# Patient Record
Sex: FEMALE | Race: WHITE | NOT HISPANIC OR LATINO | Employment: PART TIME | URBAN - METROPOLITAN AREA
[De-identification: names, ages, dates, MRNs, and addresses within clinical notes are randomized per-mention and may not be internally consistent; named-entity substitution may affect disease eponyms.]

---

## 2017-04-25 ENCOUNTER — OFFICE VISIT (OUTPATIENT)
Dept: RETAIL CLINIC | Facility: CLINIC | Age: 27
End: 2017-04-25

## 2017-04-25 VITALS — TEMPERATURE: 97.9 F | OXYGEN SATURATION: 99 % | HEART RATE: 90 BPM | RESPIRATION RATE: 18 BRPM

## 2017-04-25 DIAGNOSIS — J45.20 BRONCHITIS, ALLERGIC, MILD INTERMITTENT, UNCOMPLICATED: Primary | ICD-10-CM

## 2017-04-25 PROCEDURE — 99213 OFFICE O/P EST LOW 20 MIN: CPT | Performed by: NURSE PRACTITIONER

## 2017-04-25 RX ORDER — PREDNISONE 20 MG/1
20 TABLET ORAL 2 TIMES DAILY
Qty: 8 TABLET | Refills: 0 | Status: SHIPPED | OUTPATIENT
Start: 2017-04-25 | End: 2017-04-29

## 2017-04-25 RX ORDER — BROMPHENIRAMINE MALEATE, PSEUDOEPHEDRINE HYDROCHLORIDE, AND DEXTROMETHORPHAN HYDROBROMIDE 2; 30; 10 MG/5ML; MG/5ML; MG/5ML
5 SYRUP ORAL 4 TIMES DAILY PRN
Qty: 118 ML | Refills: 0 | Status: SHIPPED | OUTPATIENT
Start: 2017-04-25 | End: 2017-05-02

## 2017-04-25 RX ORDER — ALBUTEROL SULFATE 90 UG/1
2 AEROSOL, METERED RESPIRATORY (INHALATION) EVERY 4 HOURS PRN
Qty: 1 INHALER | Refills: 0 | Status: SHIPPED | OUTPATIENT
Start: 2017-04-25

## 2017-04-25 RX ORDER — GUAIFENESIN 600 MG/1
1200 TABLET, EXTENDED RELEASE ORAL 2 TIMES DAILY
Start: 2017-04-25 | End: 2023-01-18

## 2017-04-25 RX ORDER — FLUTICASONE PROPIONATE 50 MCG
2 SPRAY, SUSPENSION (ML) NASAL DAILY
Qty: 1 EACH | Refills: 0 | Status: SHIPPED | OUTPATIENT
Start: 2017-04-25 | End: 2017-05-25

## 2017-04-25 NOTE — PROGRESS NOTES
Subjective   Marisol Black is a 26 y.o. female presents to the clinic with reports of cough, chest congestion, and burning in the chest when breathing.     Cough   This is a new problem. The current episode started in the past 7 days. The problem has been gradually worsening. The problem occurs every few minutes. The cough is non-productive. Associated symptoms include ear congestion, headaches, postnasal drip and shortness of breath (occasional). Pertinent negatives include no fever or wheezing. Ear pain: fullness. She has tried OTC cough suppressant for the symptoms. The treatment provided no relief. Her past medical history is significant for asthma and environmental allergies.       The following portions of the patient's history were reviewed and updated as appropriate: allergies, current medications, past family history, past medical history, past social history, past surgical history and problem list.    Review of Systems   Constitutional: Negative for fever.   HENT: Positive for postnasal drip and voice change. Negative for ear discharge, sinus pressure, sneezing and trouble swallowing. Ear pain: fullness.    Eyes: Negative.    Respiratory: Positive for cough and shortness of breath (occasional). Negative for wheezing.    Cardiovascular: Negative.    Gastrointestinal: Negative.    Musculoskeletal: Negative.    Allergic/Immunologic: Positive for environmental allergies.   Neurological: Positive for headaches.   Psychiatric/Behavioral: Negative.        Objective   Physical Exam   Constitutional: She appears well-developed and well-nourished.   HENT:   Head: Normocephalic and atraumatic.   Right Ear: Hearing, external ear and ear canal normal. A middle ear effusion is present.   Left Ear: Hearing, external ear and ear canal normal. A middle ear effusion is present.   Nose: Nose normal.   Mouth/Throat: Uvula is midline and mucous membranes are normal. Posterior oropharyngeal erythema present.   Eyes: Pupils  are equal, round, and reactive to light.   Neck: Normal range of motion.   Cardiovascular: Normal rate, regular rhythm, S1 normal, S2 normal and normal heart sounds.    No murmur heard.  Pulmonary/Chest: Effort normal. She has decreased breath sounds in the right middle field, the right lower field, the left middle field and the left lower field. She has wheezes in the right upper field.   Vitals reviewed.    Vitals:    04/25/17 0901   Pulse: 90   Resp: 18   Temp: 97.9 °F (36.6 °C)   SpO2: 99%     Assessment/Plan   Marisol was seen today for cough.    Diagnoses and all orders for this visit:    Bronchitis, allergic, mild intermittent, uncomplicated  -     brompheniramine-pseudoephedrine-DM 30-2-10 MG/5ML syrup; Take 5 mL by mouth 4 (Four) Times a Day As Needed for Allergies for up to 7 days.  -     predniSONE (DELTASONE) 20 MG tablet; Take 1 tablet by mouth 2 (Two) Times a Day for 4 days.  -     albuterol (PROVENTIL HFA;VENTOLIN HFA) 108 (90 BASE) MCG/ACT inhaler; Inhale 2 puffs Every 4 (Four) Hours As Needed for Wheezing or Shortness of Air.  -     guaiFENesin (MUCINEX) 600 MG 12 hr tablet; Take 2 tablets by mouth 2 (Two) Times a Day.  -     fluticasone (FLONASE) 50 MCG/ACT nasal spray; 2 sprays into each nostril Daily for 30 days.     Plan of care reviewed with patient &/or caregiver during today's visit. Education was provided in regards to diagnosis, symptom management and any prescribed or recommended OTC medications.  Advisedt to follow up with PCP, Urgent Treatment Center, or Emergency Room if symptoms worsen. Patient and/or caregiver verbalized understanding.    SIRISHA Borrero

## 2017-04-25 NOTE — PATIENT INSTRUCTIONS
"Upper Respiratory Infection, Adult  Most upper respiratory infections (URIs) are a viral infection of the air passages leading to the lungs. A URI affects the nose, throat, and upper air passages. The most common type of URI is nasopharyngitis and is typically referred to as \"the common cold.\"  URIs run their course and usually go away on their own. Most of the time, a URI does not require medical attention, but sometimes a bacterial infection in the upper airways can follow a viral infection. This is called a secondary infection. Sinus and middle ear infections are common types of secondary upper respiratory infections.  Bacterial pneumonia can also complicate a URI. A URI can worsen asthma and chronic obstructive pulmonary disease (COPD). Sometimes, these complications can require emergency medical care and may be life threatening.   CAUSES  Almost all URIs are caused by viruses. A virus is a type of germ and can spread from one person to another.   RISKS FACTORS  You may be at risk for a URI if:   · You smoke.    · You have chronic heart or lung disease.  · You have a weakened defense (immune) system.    · You are very young or very old.    · You have nasal allergies or asthma.  · You work in crowded or poorly ventilated areas.  · You work in health care facilities or schools.  SIGNS AND SYMPTOMS   Symptoms typically develop 2-3 days after you come in contact with a cold virus. Most viral URIs last 7-10 days. However, viral URIs from the influenza virus (flu virus) can last 14-18 days and are typically more severe. Symptoms may include:   · Runny or stuffy (congested) nose.    · Sneezing.    · Cough.    · Sore throat.    · Headache.    · Fatigue.    · Fever.    · Loss of appetite.    · Pain in your forehead, behind your eyes, and over your cheekbones (sinus pain).  · Muscle aches.    DIAGNOSIS   Your health care provider may diagnose a URI by:  · Physical exam.  · Tests to check that your symptoms are not due to " another condition such as:  ¨ Strep throat.  ¨ Sinusitis.  ¨ Pneumonia.  ¨ Asthma.  TREATMENT   A URI goes away on its own with time. It cannot be cured with medicines, but medicines may be prescribed or recommended to relieve symptoms. Medicines may help:  · Reduce your fever.  · Reduce your cough.  · Relieve nasal congestion.  HOME CARE INSTRUCTIONS   · Take medicines only as directed by your health care provider.    · Gargle warm saltwater or take cough drops to comfort your throat as directed by your health care provider.  · Use a warm mist humidifier or inhale steam from a shower to increase air moisture. This may make it easier to breathe.  · Drink enough fluid to keep your urine clear or pale yellow.    · Eat soups and other clear broths and maintain good nutrition.    · Rest as needed.    · Return to work when your temperature has returned to normal or as your health care provider advises. You may need to stay home longer to avoid infecting others. You can also use a face mask and careful hand washing to prevent spread of the virus.  · Increase the usage of your inhaler if you have asthma.    · Do not use any tobacco products, including cigarettes, chewing tobacco, or electronic cigarettes. If you need help quitting, ask your health care provider.  PREVENTION   The best way to protect yourself from getting a cold is to practice good hygiene.   · Avoid oral or hand contact with people with cold symptoms.    · Wash your hands often if contact occurs.    There is no clear evidence that vitamin C, vitamin E, echinacea, or exercise reduces the chance of developing a cold. However, it is always recommended to get plenty of rest, exercise, and practice good nutrition.   SEEK MEDICAL CARE IF:   · You are getting worse rather than better.    · Your symptoms are not controlled by medicine.    · You have chills.  · You have worsening shortness of breath.  · You have brown or red mucus.  · You have yellow or brown nasal  discharge.  · You have pain in your face, especially when you bend forward.  · You have a fever.  · You have swollen neck glands.  · You have pain while swallowing.  · You have white areas in the back of your throat.  SEEK IMMEDIATE MEDICAL CARE IF:   · You have severe or persistent:    Headache.    Ear pain.    Sinus pain.    Chest pain.  · You have chronic lung disease and any of the following:    Wheezing.    Prolonged cough.    Coughing up blood.    A change in your usual mucus.  · You have a stiff neck.  · You have changes in your:    Vision.    Hearing.    Thinking.    Mood.  MAKE SURE YOU:   · Understand these instructions.  · Will watch your condition.  · Will get help right away if you are not doing well or get worse.     This information is not intended to replace advice given to you by your health care provider. Make sure you discuss any questions you have with your health care provider.     Document Released: 06/13/2002 Document Revised: 05/03/2016 Document Reviewed: 03/25/2015  [employer] Interactive Patient Education ©2016 [employer]    Acute Bronchitis  Bronchitis is inflammation of the airways that extend from the windpipe into the lungs (bronchi). The inflammation often causes mucus to develop. This leads to a cough, which is the most common symptom of bronchitis.   In acute bronchitis, the condition usually develops suddenly and goes away over time, usually in a couple weeks. Smoking, allergies, and asthma can make bronchitis worse. Repeated episodes of bronchitis may cause further lung problems.   CAUSES  Acute bronchitis is most often caused by the same virus that causes a cold. The virus can spread from person to person (contagious) through coughing, sneezing, and touching contaminated objects.  SIGNS AND SYMPTOMS   · Cough.    · Fever.    · Coughing up mucus.    · Body aches.    · Chest congestion.    · Chills.    · Shortness of breath.    · Sore throat.    DIAGNOSIS   Acute bronchitis is  usually diagnosed through a physical exam. Your health care provider will also ask you questions about your medical history. Tests, such as chest X-rays, are sometimes done to rule out other conditions.   TREATMENT   Acute bronchitis usually goes away in a couple weeks. Oftentimes, no medical treatment is necessary. Medicines are sometimes given for relief of fever or cough. Antibiotic medicines are usually not needed but may be prescribed in certain situations. In some cases, an inhaler may be recommended to help reduce shortness of breath and control the cough. A cool mist vaporizer may also be used to help thin bronchial secretions and make it easier to clear the chest.   HOME CARE INSTRUCTIONS  · Get plenty of rest.    · Drink enough fluids to keep your urine clear or pale yellow (unless you have a medical condition that requires fluid restriction). Increasing fluids may help thin your respiratory secretions (sputum) and reduce chest congestion, and it will prevent dehydration.    · Take medicines only as directed by your health care provider.  · If you were prescribed an antibiotic medicine, finish it all even if you start to feel better.  · Avoid smoking and secondhand smoke. Exposure to cigarette smoke or irritating chemicals will make bronchitis worse. If you are a smoker, consider using nicotine gum or skin patches to help control withdrawal symptoms. Quitting smoking will help your lungs heal faster.    · Reduce the chances of another bout of acute bronchitis by washing your hands frequently, avoiding people with cold symptoms, and trying not to touch your hands to your mouth, nose, or eyes.    · Keep all follow-up visits as directed by your health care provider.    SEEK MEDICAL CARE IF:  Your symptoms do not improve after 1 week of treatment.   SEEK IMMEDIATE MEDICAL CARE IF:  · You develop an increased fever or chills.    · You have chest pain.    · You have severe shortness of breath.  · You have bloody  sputum.    · You develop dehydration.  · You faint or repeatedly feel like you are going to pass out.  · You develop repeated vomiting.  · You develop a severe headache.  MAKE SURE YOU:   · Understand these instructions.  · Will watch your condition.  · Will get help right away if you are not doing well or get worse.     This information is not intended to replace advice given to you by your health care provider. Make sure you discuss any questions you have with your health care provider.     Document Released: 01/25/2006 Document Revised: 01/08/2016 Document Reviewed: 06/10/2014  [employer] Interactive Patient Education ©2016 [employer]

## 2019-04-16 ENCOUNTER — OFFICE VISIT (OUTPATIENT)
Dept: RETAIL CLINIC | Facility: CLINIC | Age: 29
End: 2019-04-16

## 2019-04-16 VITALS
HEART RATE: 107 BPM | HEIGHT: 69 IN | BODY MASS INDEX: 32.58 KG/M2 | WEIGHT: 220 LBS | OXYGEN SATURATION: 98 % | RESPIRATION RATE: 18 BRPM | DIASTOLIC BLOOD PRESSURE: 94 MMHG | SYSTOLIC BLOOD PRESSURE: 136 MMHG | TEMPERATURE: 100.2 F

## 2019-04-16 DIAGNOSIS — J01.90 ACUTE NON-RECURRENT SINUSITIS, UNSPECIFIED LOCATION: ICD-10-CM

## 2019-04-16 DIAGNOSIS — R42 VERTIGO: Primary | ICD-10-CM

## 2019-04-16 PROCEDURE — 99213 OFFICE O/P EST LOW 20 MIN: CPT | Performed by: NURSE PRACTITIONER

## 2019-04-16 RX ORDER — MECLIZINE HCL 25MG 25 MG/1
25 TABLET, CHEWABLE ORAL 3 TIMES DAILY PRN
Qty: 21 TABLET | Refills: 0 | Status: SHIPPED | OUTPATIENT
Start: 2019-04-16 | End: 2019-04-23

## 2019-04-16 RX ORDER — METHYLPREDNISOLONE 4 MG/1
TABLET ORAL
Qty: 21 TABLET | Refills: 0 | Status: SHIPPED | OUTPATIENT
Start: 2019-04-16 | End: 2023-01-18

## 2019-04-16 RX ORDER — AMOXICILLIN AND CLAVULANATE POTASSIUM 875; 125 MG/1; MG/1
1 TABLET, FILM COATED ORAL 2 TIMES DAILY
Qty: 20 TABLET | Refills: 0 | Status: SHIPPED | OUTPATIENT
Start: 2019-04-16 | End: 2019-04-26

## 2019-04-16 NOTE — PATIENT INSTRUCTIONS
Sinusitis, Adult  Sinusitis is soreness and inflammation of your sinuses. Sinuses are hollow spaces in the bones around your face. Your sinuses are located:  · Around your eyes.  · In the middle of your forehead.  · Behind your nose.  · In your cheekbones.    Your sinuses and nasal passages are lined with a stringy fluid (mucus). Mucus normally drains out of your sinuses. When your nasal tissues become inflamed or swollen, mucus can become trapped or blocked. Blocked or trapped mucus makes it difficult for air to flow through your sinuses. This allows bacteria, viruses, and funguses to grow, which leads to infection. Most infections of the sinuses are caused by a virus.  Sinusitis can develop quickly. It can last for 7?10 days (acute) or for more than 12 weeks (chronic). Sinusitis often develops after a cold.  What are the causes?  This condition is caused by anything that creates swelling in the sinuses or stops mucus from draining, including:  · Allergies.  · Asthma.  · Bacterial or viral infection.  · Abnormally shaped bones between the nasal passages.  · Nasal growths that contain mucus (nasal polyps).  · Narrow sinus openings.  · Pollutants, such as chemicals or irritants in the air.  · A foreign object stuck in the nose.  · A fungal infection. This is rare.    What increases the risk?  The following factors may make you more likely to develop this condition:  · Having allergies or asthma.  · Having had a recent cold or respiratory tract infection.  · Having structural deformities or blockages in your nose or sinuses.  · Having a weak immune system.  · Doing a lot of swimming or diving.  · Overusing nasal sprays.  · Smoking.    What are the signs or symptoms?  The main symptoms of this condition are pain and a feeling of pressure around the affected sinuses. Other symptoms include:  · Upper toothache.  · Earache.  · Headache.  · Bad breath.  · Decreased sense of smell and taste.  · A cough that may get worse at  night.  · Fatigue.  · Fever.  · Thick drainage from your nose. The drainage is often green and it may contain pus (purulent).  · Stuffy nose or congestion.  · Postnasal drip. This is when extra mucus collects in the throat or back of the nose.  · Swelling and warmth over the affected sinuses.  · Sore throat.  · Sensitivity to light.    How is this diagnosed?  This condition is diagnosed based on symptoms, a medical history, and a physical exam. To find out if your condition is acute or chronic, your health care provider may:  · Look in your nose for signs of nasal polyps.  · Tap over the affected sinus to check for signs of infection.  · View the inside of your sinuses using an imaging device that has a light attached (endoscope).    If your health care provider suspects that you have chronic sinusitis, you may also:  · Be tested for allergies.  · Have a sample of mucus taken from your nose (nasal culture) and checked for bacteria.  · Have a mucus sample examined to see if your sinusitis is related to an allergy.    If your sinusitis does not respond to treatment and it lasts longer than 8 weeks, you may have an MRI or CT scan to check your sinuses. These scans also help to determine how severe your infection is.  In rare cases, a bone biopsy may be done to rule out more serious types of fungal sinus disease.  How is this treated?  Treatment for sinusitis depends on the cause and whether your condition is chronic or acute. If a virus is causing your sinusitis, your symptoms will go away on their own within 10 days. You may be given medicines to relieve your symptoms, including:  · Topical nasal decongestants. They shrink swollen nasal passages and let mucus drain from your sinuses.  · Antihistamines. These drugs block inflammation that is triggered by allergies. This can help to ease swelling in your nose and sinuses.  · Topical nasal corticosteroids. These are nasal sprays that ease inflammation and swelling in  your nose and sinuses.  · Nasal saline washes. These rinses can help to get rid of thick mucus in your nose.    If your condition is caused by bacteria, your health care provider may recommend waiting to see if your symptoms improve. Most bacterial infections will get better without antibiotic medicine. If you have a severe infection or a weak immune system, you may be prescribed antibiotics.  Surgery may be needed to correct underlying conditions, such as narrow nasal passages. Surgery may also be needed to remove polyps.  Follow these instructions at home:  Medicines  · Take, use, or apply over-the-counter and prescription medicines only as told by your health care provider. These may include nasal sprays.  · If you were prescribed an antibiotic, take it as told by your health care provider. Do not stop taking the antibiotic even if you start to feel better.  Hydrate and Humidify  · Drink enough water to keep your urine clear or pale yellow. Staying hydrated will help to thin your mucus.  · Use a cool mist humidifier to keep the humidity level in your home above 50%.  · Inhale steam for 10-15 minutes, 3-4 times a day or as told by your health care provider. You can do this in the bathroom while a hot shower is running.  · Limit your exposure to cool or dry air.  Rest  · Rest as much as possible.  · Sleep with your head raised (elevated).  · Make sure to get enough sleep each night.  General instructions  · Apply a warm, moist washcloth to your face 3-4 times a day or as told by your health care provider. This will help with discomfort.  · Wash your hands often with soap and water to reduce your exposure to viruses and other germs. If soap and water are not available, use hand .  · Do not smoke. Avoid being around people who are smoking (secondhand smoke).  · Keep all follow-up visits as told by your health care provider. This is important.  Contact a health care provider if:  · You have a fever.  · Your  symptoms get worse.  · Your symptoms do not improve within 10 days.  Get help right away if:  · You have a severe headache.  · You have persistent vomiting.  · You have pain or swelling around your face or eyes.  · You have vision problems.  · You develop confusion.  · Your neck is stiff.  · You have trouble breathing.  This information is not intended to replace advice given to you by your health care provider. Make sure you discuss any questions you have with your health care provider.  Document Released: 12/18/2006 Document Revised: 06/28/2018 Document Reviewed: 10/12/2016  [employer] Interactive Patient Education © 2019 [employer]  Vertigo  Vertigo is the feeling that you or your surroundings are moving when they are not. Vertigo can be dangerous if it occurs while you are doing something that could endanger you or others, such as driving.  What are the causes?  This condition is caused by a disturbance in the signals that are sent by your body’s sensory systems to your brain. Different causes of a disturbance can lead to vertigo, including:  · Infections, especially in the inner ear.  · A bad reaction to a drug, or misuse of alcohol and medicines.  · Withdrawal from drugs or alcohol.  · Quickly changing positions, as when lying down or rolling over in bed.  · Migraine headaches.  · Decreased blood flow to the brain.  · Decreased blood pressure.  · Increased pressure in the brain from a head or neck injury, stroke, infection, tumor, or bleeding.  · Central nervous system disorders.    What are the signs or symptoms?  Symptoms of this condition usually occur when you move your head or your eyes in different directions. Symptoms may start suddenly, and they usually last for less than a minute. Symptoms may include:  · Loss of balance and falling.  · Feeling like you are spinning or moving.  · Feeling like your surroundings are spinning or moving.  · Nausea and vomiting.  · Blurred vision or double  vision.  · Difficulty hearing.  · Slurred speech.  · Dizziness.  · Involuntary eye movement (nystagmus).    Symptoms can be mild and cause only slight annoyance, or they can be severe and interfere with daily life. Episodes of vertigo may return (recur) over time, and they are often triggered by certain movements. Symptoms may improve over time.  How is this diagnosed?  This condition may be diagnosed based on medical history and the quality of your nystagmus. Your health care provider may test your eye movements by asking you to quickly change positions to trigger the nystagmus. This may be called the Sara-Hallpike test, head thrust test, or roll test. You may be referred to a health care provider who specializes in ear, nose, and throat (ENT) problems (otolaryngologist) or a provider who specializes in disorders of the central nervous system (neurologist).  You may have additional testing, including:  · A physical exam.  · Blood tests.  · MRI.  · A CT scan.  · An electrocardiogram (ECG). This records electrical activity in your heart.  · An electroencephalogram (EEG). This records electrical activity in your brain.  · Hearing tests.    How is this treated?  Treatment for this condition depends on the cause and the severity of the symptoms. Treatment options include:  · Medicines to treat nausea or vertigo. These are usually used for severe cases. Some medicines that are used to treat other conditions may also reduce or eliminate vertigo symptoms. These include:  ? Medicines that control allergies (antihistamines).  ? Medicines that control seizures (anticonvulsants).  ? Medicines that relieve depression (antidepressants).  ? Medicines that relieve anxiety (sedatives).  · Head movements to adjust your inner ear back to normal. If your vertigo is caused by an ear problem, your health care provider may recommend certain movements to correct the problem.  · Surgery. This is rare.    Follow these instructions at  home:  Safety  · Move slowly.Avoid sudden body or head movements.  · Avoid driving.  · Avoid operating heavy machinery.  · Avoid doing any tasks that would cause danger to you or others if you would have a vertigo episode during the task.  · If you have trouble walking or keeping your balance, try using a cane for stability. If you feel dizzy or unstable, sit down right away.  · Return to your normal activities as told by your health care provider. Ask your health care provider what activities are safe for you.  General instructions  · Take over-the-counter and prescription medicines only as told by your health care provider.  · Avoid certain positions or movements as told by your health care provider.  · Drink enough fluid to keep your urine clear or pale yellow.  · Keep all follow-up visits as told by your health care provider. This is important.  Contact a health care provider if:  · Your medicines do not relieve your vertigo or they make it worse.  · You have a fever.  · Your condition gets worse or you develop new symptoms.  · Your family or friends notice any behavioral changes.  · Your nausea or vomiting gets worse.  · You have numbness or a “pins and needles” sensation in part of your body.  Get help right away if:  · You have difficulty moving or speaking.  · You are always dizzy.  · You faint.  · You develop severe headaches.  · You have weakness in your hands, arms, or legs.  · You have changes in your hearing or vision.  · You develop a stiff neck.  · You develop sensitivity to light.  This information is not intended to replace advice given to you by your health care provider. Make sure you discuss any questions you have with your health care provider.  Document Released: 09/27/2006 Document Revised: 05/31/2017 Document Reviewed: 04/11/2016  [employer] Interactive Patient Education © 2019 [employer]

## 2019-04-16 NOTE — PROGRESS NOTES
Subjective   Chief Complaint   Patient presents with   • Headache   • Dizziness       Marisol Black is a 28 y.o. female.     Headache has been associated with the dizziness and it comes and goes. Headache is in the post head and sometimes around her eyes, and is throbbing. Pt currently does not have a HA. Pt reports some congestion 1-2 weeks prior to the dizziness and HA.      Headache    Associated symptoms include dizziness, a fever and vomiting (R/T dizziness). Pertinent negatives include no abdominal pain, coughing, ear pain, nausea, rhinorrhea, sinus pressure or sore throat.   Dizziness   This is a new problem. Episode onset: 4 days. The problem occurs intermittently. The problem has been unchanged. Associated symptoms include fatigue, a fever, headaches, vertigo and vomiting (R/T dizziness). Pertinent negatives include no abdominal pain, change in bowel habit, chills, congestion, coughing, diaphoresis, myalgias, nausea or sore throat. Exacerbated by: certain movements, random movements like walking, bending. Treatments tried: claritin D. The treatment provided no relief.   URI    Associated symptoms include headaches and vomiting (R/T dizziness). Pertinent negatives include no abdominal pain, congestion, coughing, diarrhea, ear pain, nausea, rhinorrhea, sneezing or sore throat.        Allergies   Allergen Reactions   • Demerol [Meperidine] GI Intolerance   • Lexapro [Escitalopram Oxalate] Rash       Past Medical History:   Diagnosis Date   • Allergic    • Asthma     as a kid   • Depression    • History of blood transfusion    • Miscarriage        History reviewed. No pertinent surgical history.    Social History     Socioeconomic History   • Marital status: Unknown     Spouse name: Not on file   • Number of children: Not on file   • Years of education: Not on file   • Highest education level: Not on file   Occupational History   • Occupation: stay at home mom   Tobacco Use   • Smoking status: Current Every  Day Smoker     Years: 1.00   • Smokeless tobacco: Never Used   • Tobacco comment: 1 cigarette daily- quitting   Substance and Sexual Activity   • Alcohol use: Yes     Comment: occasionally   • Drug use: No   • Sexual activity: Defer     Birth control/protection: IUD   Social History Narrative    LIVES WITH HER BOYFRIEND AND CHILDREN       Family History   Problem Relation Age of Onset   • Hypertension Mother    • Heart disease Paternal Grandfather          Current Outpatient Medications:   •  Skin Protectants, Misc. (PERIGUARD EX), Apply  topically., Disp: , Rfl:   •  albuterol (PROVENTIL HFA;VENTOLIN HFA) 108 (90 BASE) MCG/ACT inhaler, Inhale 2 puffs Every 4 (Four) Hours As Needed for Wheezing or Shortness of Air., Disp: 1 inhaler, Rfl: 0  •  amoxicillin-clavulanate (AUGMENTIN) 875-125 MG per tablet, Take 1 tablet by mouth 2 (Two) Times a Day for 10 days., Disp: 20 tablet, Rfl: 0  •  guaiFENesin (MUCINEX) 600 MG 12 hr tablet, Take 2 tablets by mouth 2 (Two) Times a Day., Disp: , Rfl:   •  loratadine-pseudoephedrine (CLARITIN-D 24 HOUR)  MG per 24 hr tablet, Take 1 tablet by mouth Daily., Disp: 30 tablet, Rfl: 0  •  meclizine 25 MG chewable tablet chewable tablet, Chew 1 tablet 3 (Three) Times a Day As Needed (vertigo) for up to 7 days., Disp: 21 tablet, Rfl: 0  •  methylPREDNISolone (MEDROL, MARLI,) 4 MG tablet, Take as directed on package instructions., Disp: 21 tablet, Rfl: 0      Review of Systems   Constitutional: Positive for fatigue and fever. Negative for chills and diaphoresis.   HENT: Positive for postnasal drip. Negative for congestion, ear pain, facial swelling, rhinorrhea, sinus pressure, sneezing and sore throat.    Respiratory: Negative for cough.    Gastrointestinal: Positive for vomiting (R/T dizziness). Negative for abdominal pain, change in bowel habit, diarrhea and nausea.   Musculoskeletal: Negative for myalgias.   Neurological: Positive for dizziness, vertigo, light-headedness and headache.  "       Vitals:    04/16/19 1524   BP: 136/94   BP Location: Left arm   Patient Position: Sitting   Pulse: 107   Resp: 18   Temp: 100.2 °F (37.9 °C)   TempSrc: Oral   SpO2: 98%   Weight: 99.8 kg (220 lb)   Height: 175.3 cm (69\")       Objective   Physical Exam   Constitutional: She is oriented to person, place, and time. She appears well-developed and well-nourished.   HENT:   Head: Normocephalic.   Right Ear: Tympanic membrane, external ear and ear canal normal.   Left Ear: Tympanic membrane, external ear and ear canal normal.   Nose: Rhinorrhea and congestion present. Right sinus exhibits no maxillary sinus tenderness and no frontal sinus tenderness. Left sinus exhibits no maxillary sinus tenderness and no frontal sinus tenderness.   Mouth/Throat: Oropharynx is clear and moist and mucous membranes are normal. No tonsillar exudate.   Eyes: Conjunctivae are normal.   Cardiovascular: Regular rhythm, S1 normal, S2 normal and normal heart sounds. Tachycardia present.   Pulmonary/Chest: Effort normal and breath sounds normal.   Abdominal: Soft. Normal appearance. There is no tenderness.   Lymphadenopathy:     She has no cervical adenopathy.   Neurological: She is alert and oriented to person, place, and time.        Procedures     Assessment/Plan   Marisol was seen today for headache and dizziness.    Diagnoses and all orders for this visit:    Vertigo  -     methylPREDNISolone (MEDROL, MARLI,) 4 MG tablet; Take as directed on package instructions.  -     meclizine 25 MG chewable tablet chewable tablet; Chew 1 tablet 3 (Three) Times a Day As Needed (vertigo) for up to 7 days.  -     loratadine-pseudoephedrine (CLARITIN-D 24 HOUR)  MG per 24 hr tablet; Take 1 tablet by mouth Daily.    Acute non-recurrent sinusitis, unspecified location  -     amoxicillin-clavulanate (AUGMENTIN) 875-125 MG per tablet; Take 1 tablet by mouth 2 (Two) Times a Day for 10 days.          PLAN: Discussed dosing, side effects, recommended " other symptomatic care.  Patient should follow up with primary care provider if symptoms worsen, fail to resolve or other symptoms need attention. Patient/family agree to the above. Advised the pt if the vertigo continues and does not improve she should see her PCP for possible ENT referral or go to the ED for further evaluation. Advised to continue taking Flonase at home as well. Treatment with antibiotic due to possible sinusitis as to she was congested 1-2 weeks prior to the vertigo and HA.    An After Visit Summary was printed and given to the patient.      SIRISHA Ferrer

## 2021-12-20 ENCOUNTER — HOSPITAL ENCOUNTER (OUTPATIENT)
Dept: GENERAL RADIOLOGY | Facility: HOSPITAL | Age: 31
Discharge: HOME | End: 2021-12-20
Admitting: NURSE PRACTITIONER

## 2021-12-20 ENCOUNTER — TRANSCRIBE ORDERS (OUTPATIENT)
Dept: GENERAL RADIOLOGY | Facility: HOSPITAL | Age: 31
End: 2021-12-20

## 2021-12-20 DIAGNOSIS — M25.552 BILATERAL HIP PAIN: Primary | ICD-10-CM

## 2021-12-20 DIAGNOSIS — M25.551 BILATERAL HIP PAIN: Primary | ICD-10-CM

## 2021-12-20 DIAGNOSIS — M25.552 BILATERAL HIP PAIN: ICD-10-CM

## 2021-12-20 DIAGNOSIS — M25.551 BILATERAL HIP PAIN: ICD-10-CM

## 2021-12-20 PROCEDURE — 72170 X-RAY EXAM OF PELVIS: CPT

## 2022-10-31 ENCOUNTER — TRANSCRIBE ORDERS (OUTPATIENT)
Dept: LAB | Facility: HOSPITAL | Age: 32
End: 2022-10-31

## 2022-10-31 ENCOUNTER — LAB (OUTPATIENT)
Dept: LAB | Facility: HOSPITAL | Age: 32
End: 2022-10-31

## 2022-10-31 DIAGNOSIS — N92.6 IRREGULAR MENSTRUAL CYCLE: Primary | ICD-10-CM

## 2022-10-31 DIAGNOSIS — N92.6 IRREGULAR MENSTRUAL CYCLE: ICD-10-CM

## 2022-10-31 PROCEDURE — 36415 COLL VENOUS BLD VENIPUNCTURE: CPT

## 2022-10-31 PROCEDURE — 84702 CHORIONIC GONADOTROPIN TEST: CPT

## 2022-10-31 PROCEDURE — 84144 ASSAY OF PROGESTERONE: CPT

## 2022-11-01 LAB
HCG INTACT+B SERPL-ACNC: NORMAL MIU/ML
PROGEST SERPL-MCNC: 7.45 NG/ML

## 2022-11-30 ENCOUNTER — TRANSCRIBE ORDERS (OUTPATIENT)
Dept: LAB | Facility: HOSPITAL | Age: 32
End: 2022-11-30

## 2022-11-30 ENCOUNTER — LAB (OUTPATIENT)
Dept: LAB | Facility: HOSPITAL | Age: 32
End: 2022-11-30

## 2022-11-30 DIAGNOSIS — Z3A.11 11 WEEKS GESTATION OF PREGNANCY: ICD-10-CM

## 2022-11-30 DIAGNOSIS — Z3A.11 11 WEEKS GESTATION OF PREGNANCY: Primary | ICD-10-CM

## 2022-11-30 LAB
ABO GROUP BLD: NORMAL
AMPHET+METHAMPHET UR QL: NEGATIVE
AMPHETAMINES UR QL: NEGATIVE
BARBITURATES UR QL SCN: NEGATIVE
BENZODIAZ UR QL SCN: NEGATIVE
BLD GP AB SCN SERPL QL: POSITIVE
BUPRENORPHINE SERPL-MCNC: NEGATIVE NG/ML
CANNABINOIDS SERPL QL: POSITIVE
COCAINE UR QL: NEGATIVE
DEPRECATED RDW RBC AUTO: 40.2 FL (ref 37–54)
ERYTHROCYTE [DISTWIDTH] IN BLOOD BY AUTOMATED COUNT: 13.1 % (ref 12.3–15.4)
HCT VFR BLD AUTO: 36.8 % (ref 34–46.6)
HGB BLD-MCNC: 12.6 G/DL (ref 12–15.9)
MCH RBC QN AUTO: 29.3 PG (ref 26.6–33)
MCHC RBC AUTO-ENTMCNC: 34.2 G/DL (ref 31.5–35.7)
MCV RBC AUTO: 85.6 FL (ref 79–97)
METHADONE UR QL SCN: NEGATIVE
OPIATES UR QL: NEGATIVE
OXYCODONE UR QL SCN: NEGATIVE
PCP UR QL SCN: NEGATIVE
PLATELET # BLD AUTO: 203 10*3/MM3 (ref 140–450)
PMV BLD AUTO: 11.2 FL (ref 6–12)
PROPOXYPH UR QL: NEGATIVE
RBC # BLD AUTO: 4.3 10*6/MM3 (ref 3.77–5.28)
RH BLD: NEGATIVE
TRICYCLICS UR QL SCN: NEGATIVE
WBC NRBC COR # BLD: 5.66 10*3/MM3 (ref 3.4–10.8)

## 2022-11-30 PROCEDURE — 86850 RBC ANTIBODY SCREEN: CPT

## 2022-11-30 PROCEDURE — G0432 EIA HIV-1/HIV-2 SCREEN: HCPCS

## 2022-11-30 PROCEDURE — 87086 URINE CULTURE/COLONY COUNT: CPT

## 2022-11-30 PROCEDURE — 87186 SC STD MICRODIL/AGAR DIL: CPT

## 2022-11-30 PROCEDURE — 80306 DRUG TEST PRSMV INSTRMNT: CPT

## 2022-11-30 PROCEDURE — 87340 HEPATITIS B SURFACE AG IA: CPT

## 2022-11-30 PROCEDURE — 86870 RBC ANTIBODY IDENTIFICATION: CPT

## 2022-11-30 PROCEDURE — 86803 HEPATITIS C AB TEST: CPT

## 2022-11-30 PROCEDURE — 86780 TREPONEMA PALLIDUM: CPT

## 2022-11-30 PROCEDURE — 86900 BLOOD TYPING SEROLOGIC ABO: CPT

## 2022-11-30 PROCEDURE — 87077 CULTURE AEROBIC IDENTIFY: CPT

## 2022-11-30 PROCEDURE — 81001 URINALYSIS AUTO W/SCOPE: CPT

## 2022-11-30 PROCEDURE — 85027 COMPLETE CBC AUTOMATED: CPT

## 2022-11-30 PROCEDURE — 86762 RUBELLA ANTIBODY: CPT

## 2022-11-30 PROCEDURE — 86787 VARICELLA-ZOSTER ANTIBODY: CPT

## 2022-11-30 PROCEDURE — 86901 BLOOD TYPING SEROLOGIC RH(D): CPT

## 2022-11-30 PROCEDURE — 36415 COLL VENOUS BLD VENIPUNCTURE: CPT

## 2022-12-01 ENCOUNTER — TRANSCRIBE ORDERS (OUTPATIENT)
Dept: OBSTETRICS AND GYNECOLOGY | Facility: HOSPITAL | Age: 32
End: 2022-12-01

## 2022-12-01 DIAGNOSIS — O99.210 OBESITY IN PREGNANCY, ANTEPARTUM: ICD-10-CM

## 2022-12-01 DIAGNOSIS — O36.0191: Primary | ICD-10-CM

## 2022-12-01 LAB
BACTERIA UR QL AUTO: ABNORMAL /HPF
BILIRUB UR QL STRIP: NEGATIVE
CLARITY UR: CLEAR
COLOR UR: YELLOW
GLUCOSE UR STRIP-MCNC: NEGATIVE MG/DL
HBV SURFACE AG SERPL QL IA: NORMAL
HCV AB SER DONR QL: NORMAL
HGB UR QL STRIP.AUTO: NEGATIVE
HIV1+2 AB SER QL: NORMAL
HYALINE CASTS UR QL AUTO: ABNORMAL /LPF
KETONES UR QL STRIP: NEGATIVE
LEUKOCYTE ESTERASE UR QL STRIP.AUTO: ABNORMAL
NITRITE UR QL STRIP: NEGATIVE
PH UR STRIP.AUTO: 6 [PH] (ref 5–8)
PROT UR QL STRIP: NEGATIVE
RBC # UR STRIP: ABNORMAL /HPF
REF LAB TEST METHOD: ABNORMAL
RESIDUAL RHIG DETECTED: NORMAL
SP GR UR STRIP: 1.01 (ref 1–1.03)
SQUAMOUS #/AREA URNS HPF: ABNORMAL /HPF
UROBILINOGEN UR QL STRIP: ABNORMAL
WBC # UR STRIP: ABNORMAL /HPF

## 2022-12-02 LAB
BACTERIA SPEC AEROBE CULT: ABNORMAL
RUBV IGG SERPL IA-ACNC: 1.02 INDEX
VZV IGG SER IA-ACNC: 773 INDEX

## 2022-12-05 LAB — TREPONEMA PALLIDUM IGG+IGM AB [PRESENCE] IN SERUM OR PLASMA BY IMMUNOASSAY: NON REACTIVE

## 2022-12-07 ENCOUNTER — APPOINTMENT (OUTPATIENT)
Dept: WOMENS IMAGING | Facility: HOSPITAL | Age: 32
End: 2022-12-07

## 2023-01-04 ENCOUNTER — TRANSCRIBE ORDERS (OUTPATIENT)
Dept: OBSTETRICS AND GYNECOLOGY | Facility: HOSPITAL | Age: 33
End: 2023-01-04
Payer: COMMERCIAL

## 2023-01-04 DIAGNOSIS — O09.299 CURRENT SINGLETON PREGNANCY WITH HISTORY OF CONGENITAL HEART DISEASE IN PRIOR CHILD, ANTEPARTUM: Primary | ICD-10-CM

## 2023-01-04 DIAGNOSIS — Z79.899 POLYPHARMACY: ICD-10-CM

## 2023-01-04 DIAGNOSIS — O99.210 OBESITY IN PREGNANCY, ANTEPARTUM: ICD-10-CM

## 2023-01-16 ENCOUNTER — TRANSCRIBE ORDERS (OUTPATIENT)
Dept: LAB | Facility: HOSPITAL | Age: 33
End: 2023-01-16
Payer: COMMERCIAL

## 2023-01-16 ENCOUNTER — LAB (OUTPATIENT)
Dept: LAB | Facility: HOSPITAL | Age: 33
End: 2023-01-16
Payer: COMMERCIAL

## 2023-01-16 DIAGNOSIS — R30.0 DYSURIA: ICD-10-CM

## 2023-01-16 DIAGNOSIS — R30.0 DYSURIA: Primary | ICD-10-CM

## 2023-01-16 PROCEDURE — 87086 URINE CULTURE/COLONY COUNT: CPT

## 2023-01-16 PROCEDURE — 87147 CULTURE TYPE IMMUNOLOGIC: CPT

## 2023-01-17 ENCOUNTER — APPOINTMENT (OUTPATIENT)
Dept: WOMENS IMAGING | Facility: HOSPITAL | Age: 33
End: 2023-01-17
Payer: COMMERCIAL

## 2023-01-17 LAB — BACTERIA SPEC AEROBE CULT: ABNORMAL

## 2023-01-18 ENCOUNTER — HOSPITAL ENCOUNTER (OUTPATIENT)
Dept: WOMENS IMAGING | Facility: HOSPITAL | Age: 33
Discharge: HOME | End: 2023-01-18
Admitting: OBSTETRICS & GYNECOLOGY
Payer: COMMERCIAL

## 2023-01-18 ENCOUNTER — OFFICE VISIT (OUTPATIENT)
Dept: OBSTETRICS AND GYNECOLOGY | Facility: HOSPITAL | Age: 33
End: 2023-01-18
Payer: COMMERCIAL

## 2023-01-18 VITALS — SYSTOLIC BLOOD PRESSURE: 134 MMHG | DIASTOLIC BLOOD PRESSURE: 82 MMHG | BODY MASS INDEX: 35.5 KG/M2 | WEIGHT: 240.4 LBS

## 2023-01-18 DIAGNOSIS — Z3A.18 18 WEEKS GESTATION OF PREGNANCY: ICD-10-CM

## 2023-01-18 DIAGNOSIS — O09.299 CURRENT SINGLETON PREGNANCY WITH HISTORY OF CONGENITAL HEART DISEASE IN PRIOR CHILD, ANTEPARTUM: ICD-10-CM

## 2023-01-18 DIAGNOSIS — Z79.899 POLYPHARMACY: ICD-10-CM

## 2023-01-18 DIAGNOSIS — Z82.79 FAMILY HISTORY OF FIRST DEGREE RELATIVE WITH CONGENITAL HEART DISEASE: Primary | ICD-10-CM

## 2023-01-18 DIAGNOSIS — O99.210 OBESITY IN PREGNANCY, ANTEPARTUM: ICD-10-CM

## 2023-01-18 PROCEDURE — 76811 OB US DETAILED SNGL FETUS: CPT

## 2023-01-18 PROCEDURE — 76811 OB US DETAILED SNGL FETUS: CPT | Performed by: OBSTETRICS & GYNECOLOGY

## 2023-01-18 RX ORDER — METFORMIN HYDROCHLORIDE 500 MG/1
1 TABLET, EXTENDED RELEASE ORAL 2 TIMES DAILY
COMMUNITY
Start: 2023-01-02

## 2023-01-18 RX ORDER — PNV NO.95/FERROUS FUM/FOLIC AC 28MG-0.8MG
1 TABLET ORAL DAILY
COMMUNITY
Start: 2022-12-04

## 2023-01-18 RX ORDER — AMPICILLIN 500 MG/1
1 CAPSULE ORAL DAILY
COMMUNITY
Start: 2023-01-17 | End: 2023-03-29

## 2023-01-18 RX ORDER — DOCUSATE SODIUM 100 MG/1
1 CAPSULE, LIQUID FILLED ORAL AS NEEDED
COMMUNITY
Start: 2023-01-04

## 2023-01-18 NOTE — PROGRESS NOTES
Patient seen in Maternal Fetal Medicine clinic today. Please see full note in under imaging tab of patient chart in Epic (Viewpoint report).    Cheri Mancilla MD

## 2023-01-18 NOTE — PROGRESS NOTES
"Pt concerned about \"not gaining any weight yet, not feeling baby move yet\".  Current UTI and yeast infection, GBS+, Next f/u on 2/1/2023, NIPT low risk male   "

## 2023-02-20 ENCOUNTER — TRANSCRIBE ORDERS (OUTPATIENT)
Dept: LAB | Facility: HOSPITAL | Age: 33
End: 2023-02-20
Payer: COMMERCIAL

## 2023-02-20 ENCOUNTER — LAB (OUTPATIENT)
Dept: LAB | Facility: HOSPITAL | Age: 33
End: 2023-02-20
Payer: COMMERCIAL

## 2023-02-20 DIAGNOSIS — Z3A.20 20 WEEKS GESTATION OF PREGNANCY: Primary | ICD-10-CM

## 2023-02-20 DIAGNOSIS — Z3A.20 20 WEEKS GESTATION OF PREGNANCY: ICD-10-CM

## 2023-02-20 PROCEDURE — 87086 URINE CULTURE/COLONY COUNT: CPT

## 2023-02-21 LAB — BACTERIA SPEC AEROBE CULT: NORMAL

## 2023-03-01 ENCOUNTER — HOSPITAL ENCOUNTER (OUTPATIENT)
Dept: WOMENS IMAGING | Facility: HOSPITAL | Age: 33
Discharge: HOME | End: 2023-03-01
Admitting: OBSTETRICS & GYNECOLOGY
Payer: COMMERCIAL

## 2023-03-01 ENCOUNTER — OFFICE VISIT (OUTPATIENT)
Dept: OBSTETRICS AND GYNECOLOGY | Facility: HOSPITAL | Age: 33
End: 2023-03-01
Payer: COMMERCIAL

## 2023-03-01 VITALS — SYSTOLIC BLOOD PRESSURE: 132 MMHG | WEIGHT: 255.2 LBS | BODY MASS INDEX: 37.69 KG/M2 | DIASTOLIC BLOOD PRESSURE: 78 MMHG

## 2023-03-01 DIAGNOSIS — Z82.79 FAMILY HISTORY OF FIRST DEGREE RELATIVE WITH CONGENITAL HEART DISEASE: ICD-10-CM

## 2023-03-01 DIAGNOSIS — Z82.79 FAMILY HISTORY OF FIRST DEGREE RELATIVE WITH CONGENITAL HEART DISEASE: Primary | ICD-10-CM

## 2023-03-01 DIAGNOSIS — Z3A.24 24 WEEKS GESTATION OF PREGNANCY: ICD-10-CM

## 2023-03-01 DIAGNOSIS — E66.9 OBESITY (BMI 30-39.9): ICD-10-CM

## 2023-03-01 PROCEDURE — 76825 ECHO EXAM OF FETAL HEART: CPT | Performed by: OBSTETRICS & GYNECOLOGY

## 2023-03-01 PROCEDURE — 76825 ECHO EXAM OF FETAL HEART: CPT

## 2023-03-01 PROCEDURE — 76816 OB US FOLLOW-UP PER FETUS: CPT

## 2023-03-01 PROCEDURE — 76816 OB US FOLLOW-UP PER FETUS: CPT | Performed by: OBSTETRICS & GYNECOLOGY

## 2023-03-01 PROCEDURE — 93325 DOPPLER ECHO COLOR FLOW MAPG: CPT

## 2023-03-01 PROCEDURE — 93325 DOPPLER ECHO COLOR FLOW MAPG: CPT | Performed by: OBSTETRICS & GYNECOLOGY

## 2023-03-29 ENCOUNTER — HOSPITAL ENCOUNTER (OUTPATIENT)
Dept: WOMENS IMAGING | Facility: HOSPITAL | Age: 33
Discharge: HOME | End: 2023-03-29
Payer: COMMERCIAL

## 2023-03-29 ENCOUNTER — OFFICE VISIT (OUTPATIENT)
Dept: OBSTETRICS AND GYNECOLOGY | Facility: HOSPITAL | Age: 33
End: 2023-03-29
Payer: COMMERCIAL

## 2023-03-29 ENCOUNTER — TRANSCRIBE ORDERS (OUTPATIENT)
Dept: LAB | Facility: HOSPITAL | Age: 33
End: 2023-03-29
Payer: COMMERCIAL

## 2023-03-29 ENCOUNTER — LAB (OUTPATIENT)
Dept: LAB | Facility: HOSPITAL | Age: 33
End: 2023-03-29
Payer: COMMERCIAL

## 2023-03-29 VITALS — BODY MASS INDEX: 39.53 KG/M2 | DIASTOLIC BLOOD PRESSURE: 71 MMHG | SYSTOLIC BLOOD PRESSURE: 123 MMHG | WEIGHT: 267.7 LBS

## 2023-03-29 DIAGNOSIS — Z82.79 FAMILY HISTORY OF FIRST DEGREE RELATIVE WITH CONGENITAL HEART DISEASE: ICD-10-CM

## 2023-03-29 DIAGNOSIS — E66.9 OBESITY (BMI 30-39.9): ICD-10-CM

## 2023-03-29 DIAGNOSIS — Z34.83 PRENATAL CARE, SUBSEQUENT PREGNANCY, THIRD TRIMESTER: ICD-10-CM

## 2023-03-29 DIAGNOSIS — O35.9XX0 KNOWN FETAL ANOMALY, ANTEPARTUM, SINGLE OR UNSPECIFIED FETUS: Primary | ICD-10-CM

## 2023-03-29 DIAGNOSIS — Z3A.28 28 WEEKS GESTATION OF PREGNANCY: Primary | ICD-10-CM

## 2023-03-29 DIAGNOSIS — Z34.90 PREGNANCY, UNSPECIFIED GESTATIONAL AGE: ICD-10-CM

## 2023-03-29 DIAGNOSIS — Z3A.28 28 WEEKS GESTATION OF PREGNANCY: ICD-10-CM

## 2023-03-29 LAB
BLD GP AB SCN SERPL QL: NEGATIVE
DEPRECATED RDW RBC AUTO: 39.1 FL (ref 37–54)
ERYTHROCYTE [DISTWIDTH] IN BLOOD BY AUTOMATED COUNT: 12.4 % (ref 12.3–15.4)
GLUCOSE 1H P 100 G GLC PO SERPL-MCNC: 200 MG/DL (ref 65–139)
HCT VFR BLD AUTO: 35 % (ref 34–46.6)
HGB BLD-MCNC: 12 G/DL (ref 12–15.9)
MCH RBC QN AUTO: 29.8 PG (ref 26.6–33)
MCHC RBC AUTO-ENTMCNC: 34.3 G/DL (ref 31.5–35.7)
MCV RBC AUTO: 86.8 FL (ref 79–97)
PLATELET # BLD AUTO: 194 10*3/MM3 (ref 140–450)
PMV BLD AUTO: 11.2 FL (ref 6–12)
RBC # BLD AUTO: 4.03 10*6/MM3 (ref 3.77–5.28)
T4 FREE SERPL-MCNC: 0.93 NG/DL (ref 0.93–1.7)
T4 SERPL-MCNC: 10.4 MCG/DL (ref 4.5–11.7)
TSH SERPL DL<=0.05 MIU/L-ACNC: 2.04 UIU/ML (ref 0.27–4.2)
WBC NRBC COR # BLD: 6.91 10*3/MM3 (ref 3.4–10.8)

## 2023-03-29 PROCEDURE — 85027 COMPLETE CBC AUTOMATED: CPT

## 2023-03-29 PROCEDURE — 82962 GLUCOSE BLOOD TEST: CPT | Performed by: OBSTETRICS & GYNECOLOGY

## 2023-03-29 PROCEDURE — 82950 GLUCOSE TEST: CPT

## 2023-03-29 PROCEDURE — 36415 COLL VENOUS BLD VENIPUNCTURE: CPT

## 2023-03-29 PROCEDURE — 84443 ASSAY THYROID STIM HORMONE: CPT

## 2023-03-29 PROCEDURE — 76816 OB US FOLLOW-UP PER FETUS: CPT

## 2023-03-29 PROCEDURE — 86850 RBC ANTIBODY SCREEN: CPT

## 2023-03-29 PROCEDURE — 84439 ASSAY OF FREE THYROXINE: CPT

## 2023-03-29 NOTE — ASSESSMENT & PLAN NOTE
Patient returns today for follow-up for suspected fetal spina bifida occulta.  Ultrasound today demonstrates a normally grown fetus with probable single segment spina bifida occulta.  There is some splaying for 1 vertebra the lateral margins of the spinal canal there is clear skin over this defect and the posterior fossa appears entirely normal.    Spina bifida occulta, or hidden spina bifida, is a mild form of spina bifida caused by a gap forming between the vertebrae in your spinal cord. This gap happens during fetal development in the uterus. Spina bifida occulta rarely causes symptoms, and most people diagnosed with the condition don’t know they have it.Spina bifida occulta is the most common type of spina bifida and the condition affects 10% to 20% of the [place] population.    At the current time no alterations in observatory care is necessary given his condition.  We would recommend informing the pediatricians of the suspected spina bifida occulta and they can evaluate that after birth.

## 2023-03-29 NOTE — PROGRESS NOTES
"Documentation of the ultrasound findings, images, and interpretations will be available in the patient's Viewpoint report which is located in the imaging tab in chart review.        Maternal/Fetal Medicine Follow Up  Note     Name: Marisol Black    : 1990     MRN: [id number]     Referring Provider: Mihaela Lacy MD    Chief Complaint  No chief complaint on file.    Subjective     History of Present Illness:  Marisol Black is a 32 y.o.  28w5d who presents today for fetal spina bifida occulta.    FRANK: Estimated Date of Delivery: 23     ROS:   As noted in HPI.     Objective     Vital Signs  /71   Wt 121 kg (267 lb 11.2 oz)   LMP 2022   Estimated body mass index is 39.53 kg/m² as calculated from the following:    Height as of 19: 175.3 cm (69\").    Weight as of this encounter: 121 kg (267 lb 11.2 oz).    Physical Exam    Ultrasound Impression:   See viewpoint.  Probable single segment spina bifida occulta.    Assessment and Plan     Marisol Black is a 32 y.o.  28w5d who presents today for  fetal spina bifida occulta    Diagnoses and all orders for this visit:    1. Known fetal anomaly, antepartum, single or unspecified fetus (Primary)  Assessment & Plan:  Patient returns today for follow-up for suspected fetal spina bifida occulta.  Ultrasound today demonstrates a normally grown fetus with probable single segment spina bifida occulta.  There is some splaying for 1 vertebra the lateral margins of the spinal canal there is clear skin over this defect and the posterior fossa appears entirely normal.    Spina bifida occulta, or hidden spina bifida, is a mild form of spina bifida caused by a gap forming between the vertebrae in your spinal cord. This gap happens during fetal development in the uterus. Spina bifida occulta rarely causes symptoms, and most people diagnosed with the condition don’t know they have it.Spina bifida occulta is the most common type of spina " bifida and the condition affects 10% to 20% of the [place] population.    At the current time no alterations in observatory care is necessary given his condition.  We would recommend informing the pediatricians of the suspected spina bifida occulta and they can evaluate that after birth.        2. Family history of first degree relative with congenital heart disease    3. Pregnancy, unspecified gestational age       Follow Up  No follow-ups on file.    I spent 15minutes caring for the patient on the day of service. This included: obtaining or reviewing a separately obtained medical history, reviewing patient records, performing a medically appropriate exam and/or evaluation, counseling or educating the patient/family/caregiver, ordering medications, labs, and/or procedures and documenting such in the medical record. This does not include time spent on review and interpretation of other tests such as fetal ultrasound or the performance of other procedures such as amniocentesis or CVS.      Douglas A. Milligan, MD  Maternal Fetal Medicine, [facility]     2023

## 2023-04-14 ENCOUNTER — OFFICE VISIT (OUTPATIENT)
Dept: ENDOCRINOLOGY | Facility: CLINIC | Age: 33
End: 2023-04-14
Payer: COMMERCIAL

## 2023-04-14 VITALS
HEART RATE: 96 BPM | BODY MASS INDEX: 40.88 KG/M2 | HEIGHT: 69 IN | OXYGEN SATURATION: 98 % | SYSTOLIC BLOOD PRESSURE: 144 MMHG | WEIGHT: 276 LBS | DIASTOLIC BLOOD PRESSURE: 92 MMHG

## 2023-04-14 DIAGNOSIS — O24.419 GESTATIONAL DIABETES MELLITUS (GDM), ANTEPARTUM, GESTATIONAL DIABETES METHOD OF CONTROL UNSPECIFIED: Primary | ICD-10-CM

## 2023-04-14 LAB
EXPIRATION DATE: NORMAL
EXPIRATION DATE: NORMAL
GLUCOSE BLDC GLUCOMTR-MCNC: 97 MG/DL (ref 70–130)
HBA1C MFR BLD: 5.2 %
Lab: NORMAL
Lab: NORMAL

## 2023-04-14 PROCEDURE — 83036 HEMOGLOBIN GLYCOSYLATED A1C: CPT | Performed by: INTERNAL MEDICINE

## 2023-04-14 PROCEDURE — 82947 ASSAY GLUCOSE BLOOD QUANT: CPT | Performed by: INTERNAL MEDICINE

## 2023-04-14 PROCEDURE — 3044F HG A1C LEVEL LT 7.0%: CPT | Performed by: INTERNAL MEDICINE

## 2023-04-14 PROCEDURE — 99203 OFFICE O/P NEW LOW 30 MIN: CPT | Performed by: INTERNAL MEDICINE

## 2023-04-14 RX ORDER — METFORMIN HYDROCHLORIDE 500 MG/1
1000 TABLET, EXTENDED RELEASE ORAL
Qty: 60 TABLET
Start: 2023-04-14 | End: 2023-04-19

## 2023-04-14 RX ORDER — LANCETS 30 GAUGE
EACH MISCELLANEOUS
Qty: 200 EACH | Refills: 1 | Status: SHIPPED | OUTPATIENT
Start: 2023-04-14

## 2023-04-14 NOTE — PROGRESS NOTES
"Chief Complaint   Patient presents with   • Gestational Diabetes        New patient who is being seen in consultation regarding gestational diabetes at the request of No ref. provider found     HPI   Marisol Black is a 32 y.o. female who presents for evaluation of gestational diabetes.    Patient is currently in the third trimester.  She has not had a history of gestational diabetes in prior pregnancies.  Gestational diabetes was diagnosed via routine screening with failed GCT, glucose 200.  She did not complete 3-hour OGTT.  She has not yet begun monitoring blood glucose.    Mariah reports that metformin was started last year due to concern for weight gain. She reports that A1C at that time was 5.3. She reported GI upset when this was first started but reprots that she has not been taking this regularly. She takes this once daily, at most and sometimes forgets this.     Patient reports that baby was measuring normally at the time of most recent ultrasound.  Estimated fetal weight in the 45th percentile on ultrasound dated 3/29/2023    Patient reports eating a generally healthy diet.  She does not drink sweetened beverages.  She does report that she frequently does eat fruit in place of a meal when she is working.  She does report that she is active at work.      Past Medical History:   Diagnosis Date   • Asthma     as a kid, pt reports \"I don't remeber ever not having an inhaler\"   • Asthma due to seasonal allergies    • Depression 2007   • Disease of thyroid gland     pt states \"I am concerned about it, but no official diagnosis or meds\"   • Gestational diabetes    • History of bipolar disorder 2007   • History of blood transfusion 2011    post miscarriage   • Miscarriage     2011- pt reports getting blood transfusion     Past Surgical History:   Procedure Laterality Date   • DILATION AND CURETTAGE, DIAGNOSTIC / THERAPEUTIC      2011- bleeding- blood transfusion, early miscarriage   • WISDOM TOOTH EXTRACTION   " "     Family History   Problem Relation Age of Onset   • Hypertension Mother    • Obesity Sister    • Asthma Daughter    • Heart defect Son       Social History     Socioeconomic History   • Marital status: Single   Tobacco Use   • Smoking status: Former     Years: 1.00     Types: Cigarettes     Quit date: 2020     Years since quitting: 3.2   • Smokeless tobacco: Never   Vaping Use   • Vaping Use: Never used   Substance and Sexual Activity   • Alcohol use: Yes     Comment: occasionally   • Drug use: Not Currently     Types: Marijuana     Comment: last smoked in August, pt reports \" Not a thing at all anymore\"   • Sexual activity: Defer      Allergies   Allergen Reactions   • Demerol [Meperidine] GI Intolerance     Vomiting and diarrhea    • Lexapro [Escitalopram Oxalate] Rash      Current Outpatient Medications on File Prior to Visit   Medication Sig Dispense Refill   • albuterol (PROVENTIL HFA;VENTOLIN HFA) 108 (90 BASE) MCG/ACT inhaler Inhale 2 puffs Every 4 (Four) Hours As Needed for Wheezing or Shortness of Air. 1 inhaler 0   • docusate sodium (COLACE) 100 MG capsule Take 1 capsule by mouth As Needed.     • Prenatal Vit-Fe Fumarate-FA (prenatal vitamin 28-0.8) 28-0.8 MG tablet tablet Take 1 tablet by mouth Daily.     • [DISCONTINUED] metFORMIN ER (GLUCOPHAGE-XR) 500 MG 24 hr tablet Take 1 mg by mouth 2 (Two) Times a Day.       No current facility-administered medications on file prior to visit.        Review of Systems   Constitutional: Positive for fatigue.   Respiratory: Positive for shortness of breath.    Cardiovascular: Positive for leg swelling.   Gastrointestinal: Positive for GERD.   Endocrine: Positive for cold intolerance and polyuria.   Genitourinary: Positive for urinary incontinence.   Musculoskeletal: Positive for arthralgias.   Allergic/Immunologic: Positive for environmental allergies.   Psychiatric/Behavioral: Positive for sleep disturbance. The patient is nervous/anxious.           Vitals:    " "04/14/23 0834   BP: 144/92   BP Location: Right arm   Patient Position: Sitting   Cuff Size: Adult   Pulse: 96   SpO2: 98%   Weight: 125 kg (276 lb)   Height: 175.3 cm (69\")   Body mass index is 40.76 kg/m².     Physical Exam  Vitals reviewed.   Constitutional:       General: She is not in acute distress.  HENT:      Head: Normocephalic and atraumatic.   Neck:      Thyroid: No thyromegaly.   Cardiovascular:      Rate and Rhythm: Normal rate and regular rhythm.   Pulmonary:      Effort: Pulmonary effort is normal.      Breath sounds: Normal breath sounds.   Abdominal:      Comments: gravid   Lymphadenopathy:      Head:      Right side of head: No submandibular adenopathy.      Left side of head: No submandibular adenopathy.      Cervical: No cervical adenopathy.   Skin:     General: Skin is warm and dry.   Neurological:      General: No focal deficit present.      Mental Status: She is alert.   Psychiatric:         Mood and Affect: Mood and affect normal.         Behavior: Behavior is cooperative.            Labs/Imaging   Latest Reference Range & Units 03/29/23 11:50 03/29/23 12:01   TSH Baseline 0.270 - 4.200 uIU/mL  2.040   T4, Total 4.50 - 11.70 mcg/dL  10.40   Free T4 0.93 - 1.70 ng/dL  0.93   Gestational GCT 65 - 139 mg/dL 200 (H)    (H): Data is abnormally high     Latest Reference Range & Units 04/14/23 09:03 04/14/23 09:05   Glucose 70 - 130 mg/dL 97    Hemoglobin A1C %  5.2       Assessment and Plan    Diagnoses and all orders for this visit:    1. Gestational diabetes mellitus (GDM), antepartum, gestational diabetes method of control unspecified (Primary)  -     POC Glucose, Blood  -     POC Glycosylated Hemoglobin (Hb A1C)  Currently in third trimester  Discussed with patient that hemoglobin A1c is normal during visit today.  We did review this is falsely low in pregnancy.  Fingerstick within acceptable range as patient is postprandial.  Patient has not yet begun glycemic monitoring, supplies sent to " pharmacy today.  Patient is currently taking metformin, though irregularly.  We did review that this medication crosses the placenta.  Patient voiced understanding.  Plan to increase dose to 1000 mg daily.  Discussed with patient that based on degree of elevation of GCT, it is possible patient may require insulin during this pregnancy.  However, there is insufficient data at present to make this determination.  Patient will initiate glycemic monitoring and contact the office early next week with an update.  Reviewed instructions for glycemic monitoring and the monitoring of urine ketones.  Discussed that gestational diabetes can become harder to control as pregnancy progresses. Reviewed need for routine follow up from now until delivery.   -Patient instructed to send glucose data weekly via charity: water.  Risks of gestational diabetes were reviewed, these include, but are not limited to: LGA infant, macrosomia, stillbirth,  hypoglycemia, hyperbilirubinemia, birth injury,  birth, respiratory complications following delivery, polyhydramnios, hypocalcemia, maternal preeclampsia.  Nutritional goals reviewed: Patient advised to eat 3 moderate sized meals daily as well as 2-4 snacks, including a bedtime snack. Discussed need for carbohydrate awareness. Patient given goals for carbohydrate intake for meals/snacks.  Glycemic Targets during pregnancy were reviewed, as follows: fasting glucose <95, 1 hour post prandial <140, 2 hour postprandial <120.  Patient advised that she will need to monitor blood glucose up to 6-8 times daily. In the event that insurance may not cover adequate testing supplies, she was instructed she may need to purchase on OTC meter and strips.  Patient will return for follow up in 1 weeks. She was instructed to contact the office in the interim if glucoses not at target.       Return in about 1 week (around 2023) for Next scheduled follow up. The patient was instructed to contact the  clinic with any interval questions or concerns.    Aniya Carrington MD     Dictated Utilizing Dragon Dictation

## 2023-04-19 ENCOUNTER — OFFICE VISIT (OUTPATIENT)
Dept: ENDOCRINOLOGY | Facility: CLINIC | Age: 33
End: 2023-04-19
Payer: COMMERCIAL

## 2023-04-19 VITALS
OXYGEN SATURATION: 99 % | HEIGHT: 69 IN | WEIGHT: 275 LBS | HEART RATE: 94 BPM | SYSTOLIC BLOOD PRESSURE: 130 MMHG | BODY MASS INDEX: 40.73 KG/M2 | DIASTOLIC BLOOD PRESSURE: 80 MMHG

## 2023-04-19 DIAGNOSIS — O24.419 GESTATIONAL DIABETES MELLITUS (GDM), ANTEPARTUM, GESTATIONAL DIABETES METHOD OF CONTROL UNSPECIFIED: Primary | ICD-10-CM

## 2023-04-19 LAB
EXPIRATION DATE: NORMAL
GLUCOSE BLDC GLUCOMTR-MCNC: 108 MG/DL (ref 70–130)
Lab: NORMAL

## 2023-04-19 RX ORDER — INSULIN DETEMIR 100 [IU]/ML
INJECTION, SOLUTION SUBCUTANEOUS
Qty: 15 ML | Refills: 3 | Status: SHIPPED | OUTPATIENT
Start: 2023-04-19

## 2023-04-19 RX ORDER — METFORMIN HYDROCHLORIDE 500 MG/1
500 TABLET, EXTENDED RELEASE ORAL
Qty: 60 TABLET
Start: 2023-04-19

## 2023-04-19 NOTE — PROGRESS NOTES
"Chief Complaint   Patient presents with   • Gestational Diabetes        HPI   Marisol Black is a 32 y.o. female had concerns including Gestational Diabetes.      Patient initiated glycemic monitoring in the interim since last visit.  She has been working to eliminate dietary causes of hyperglycemia.  Data for the preceding week reviewed.  Patient has had persistent fasting hyperglycemia.  She has had intermittent mild elevation of postprandial values.  She does report that she developed GI upset when she attempted to take metformin 1000 mg as a single dose.  She continues to intermittently forget to take this medication.  She has follow-up with her OB next week.    The following portions of the patient's history were reviewed and updated as appropriate: allergies, current medications and past social history.    Review of Systems   Gastrointestinal: Negative for abdominal pain, nausea and vomiting.   Endocrine: Negative for polydipsia and polyuria.   Musculoskeletal: Negative for myalgias.      /80 (BP Location: Left arm, Patient Position: Sitting, Cuff Size: Adult)   Pulse 94   Ht 175.3 cm (69\")   Wt 125 kg (275 lb)   LMP 09/09/2022   SpO2 99%   BMI 40.61 kg/m²      Physical Exam      Constitutional:  well developed; well nourished  no acute distress  appears stated age   ENT/Thyroid: not examined   Eyes: Conjunctiva: clear   Respiratory:  breathing is unlabored  clear to auscultation bilaterally   Cardiovascular:  regular rate and rhythm   Chest:  Not performed.   Abdomen: gravid   : Not performed.   Musculoskeletal: Not performed   Skin: not performed.   Neuro: mental status, speech normal   Psych: mood and affect are within normal limits       Labs/Imaging   Latest Reference Range & Units 04/19/23 09:38   Glucose 70 - 130 mg/dL 108       Diagnoses and all orders for this visit:    1. Gestational diabetes mellitus (GDM), antepartum, gestational diabetes method of control unspecified (Primary)  -   "   POC Glucose, Blood  Currently in third trimester  Patient with persistent fasting hyperglycemia.  Discussed recommendation for initiation of basal insulin to correct fasting hyperglycemia.  Potential adverse effects of insulin therapy, specifically hypoglycemia were reviewed during visit.  Signs and symptoms of hypoglycemia and its management were reviewed.  Patient to start Levemir or formulary equivalent 6 units daily.  She will increase by 2 units every other day until fasting glucose is consistently less than 95.  Injection teaching was completed during office visit.  Patient with intermittent postprandial hyperglycemia.  This is generally mild.  She is working to make dietary adjustments to correct this.  We will continue monitoring at this time but discussed with patient that she may ultimately need mealtime insulin during this pregnancy.  Patient to reduce metformin to 500 mg extended release once daily.  She reports GI upset when trying to take a higher dose.  Reviewed instructions for glycemic monitoring and the monitoring of urine ketones.  Discussed that gestational diabetes can become harder to control as pregnancy progresses. Reviewed need for routine follow up from now until delivery.   Patient instructed to send glucose data weekly via [employer].  Risks of gestational diabetes were reviewed, these include, but are not limited to: LGA infant, macrosomia, stillbirth,  hypoglycemia, hyperbilirubinemia, birth injury,  birth, respiratory complications following delivery, polyhydramnios, hypocalcemia, maternal preeclampsia.  Nutritional goals reviewed: Patient advised to eat 3 moderate sized meals daily as well as 2-4 snacks, including a bedtime snack. Discussed need for carbohydrate awareness. Patient given goals for carbohydrate intake for meals/snacks.  Glycemic Targets during pregnancy were reviewed, as follows: fasting glucose <95, 1 hour post prandial <140, 2 hour postprandial  <120.  Patient will return for follow up in 4 weeks. She was instructed to contact the office in the interim if glucoses not at target.    Return in about 4 weeks (around 5/17/2023) for Next scheduled follow up. The patient was instructed to contact the clinic with any interval questions or concerns.    Aniya Carrington MD   Endocrinologist    Dictated Utilizing Dragon Dictation

## 2023-04-26 LAB — EXTERNAL GROUP B STREP ANTIGEN: NORMAL

## 2023-05-05 ENCOUNTER — HOSPITAL ENCOUNTER (OUTPATIENT)
Facility: HOSPITAL | Age: 33
Discharge: HOME | End: 2023-05-06
Attending: OBSTETRICS & GYNECOLOGY | Admitting: OBSTETRICS & GYNECOLOGY
Payer: COMMERCIAL

## 2023-05-05 PROCEDURE — G0463 HOSPITAL OUTPT CLINIC VISIT: HCPCS

## 2023-05-06 VITALS
BODY MASS INDEX: 40.88 KG/M2 | DIASTOLIC BLOOD PRESSURE: 91 MMHG | RESPIRATION RATE: 18 BRPM | SYSTOLIC BLOOD PRESSURE: 139 MMHG | TEMPERATURE: 97.8 F | HEIGHT: 69 IN | HEART RATE: 99 BPM | WEIGHT: 276 LBS

## 2023-05-06 PROBLEM — O36.8130 DECREASED FETAL MOVEMENT AFFECTING MANAGEMENT OF PREGNANCY IN THIRD TRIMESTER, SINGLE OR UNSPECIFIED FETUS: Status: ACTIVE | Noted: 2023-05-06

## 2023-05-06 PROCEDURE — 59025 FETAL NON-STRESS TEST: CPT

## 2023-05-06 RX ORDER — OMEPRAZOLE 20 MG/1
20 CAPSULE, DELAYED RELEASE ORAL DAILY
COMMUNITY
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
## 2023-05-11 ENCOUNTER — OFFICE VISIT (OUTPATIENT)
Dept: ENDOCRINOLOGY | Facility: CLINIC | Age: 33
End: 2023-05-11
Payer: COMMERCIAL

## 2023-05-11 VITALS
HEIGHT: 69 IN | BODY MASS INDEX: 41.47 KG/M2 | OXYGEN SATURATION: 98 % | WEIGHT: 280 LBS | DIASTOLIC BLOOD PRESSURE: 84 MMHG | SYSTOLIC BLOOD PRESSURE: 130 MMHG | HEART RATE: 94 BPM

## 2023-05-11 DIAGNOSIS — R53.83 OTHER FATIGUE: ICD-10-CM

## 2023-05-11 DIAGNOSIS — O24.419 GESTATIONAL DIABETES MELLITUS (GDM), ANTEPARTUM, GESTATIONAL DIABETES METHOD OF CONTROL UNSPECIFIED: Primary | ICD-10-CM

## 2023-05-11 DIAGNOSIS — R63.8 UNABLE TO LOSE WEIGHT: ICD-10-CM

## 2023-05-11 LAB
EXPIRATION DATE: NORMAL
EXPIRATION DATE: NORMAL
GLUCOSE BLDC GLUCOMTR-MCNC: 116 MG/DL (ref 70–130)
HBA1C MFR BLD: 5.5 %
Lab: NORMAL
Lab: NORMAL

## 2023-05-11 RX ORDER — INSULIN GLARGINE 100 [IU]/ML
INJECTION, SOLUTION SUBCUTANEOUS
Qty: 15 ML | Refills: 1 | Status: SHIPPED | OUTPATIENT
Start: 2023-05-11

## 2023-05-11 NOTE — PROGRESS NOTES
"Chief Complaint   Patient presents with   • Gestational Diabetes        HPI   Marisol Black is a 32 y.o. female had concerns including Gestational Diabetes.      Patient presents for follow-up of gestational diabetes, now approximately 35 weeks gestation.  She did initially have to increase dose of Levemir but states that she has been taking 10 units daily for approximately the past 2 weeks.  Glucose this morning was 101 patient states that she awoke overnight and ate yogurt secondary to heartburn.  Remainder of fasting values have been at goal.  Patient does have some missing data post breakfast and post dinner.  However, all reported values have been at goal this week.  Patient does note that gastrointestinal upset Improved after Lowering Dose of Metformin.  However, She Does Report That She Ran Out Of This Medication Approximately 1 Week Ago and Has Not Picked It up at the Pharmacy.    Patient does report that she has developed a rash at her injection sites with the past couple of injections.  She previously had not had any issue.  She reports this is itchy but not otherwise bothersome.    Patient also requested that I review recent thyroid function testing as she notes concern for longstanding issues with swelling, energy and weight gain.  She is concerned that values are at the edge of normal limits.    The following portions of the patient's history were reviewed and updated as appropriate: allergies, current medications and past social history.    Review of Systems   Constitutional: Positive for fatigue and unexpected weight gain.   Cardiovascular: Positive for leg swelling.   Skin: Positive for rash.        /84 (BP Location: Left arm, Patient Position: Sitting, Cuff Size: Adult)   Pulse 94   Ht 175.3 cm (69\")   Wt 127 kg (280 lb)   LMP 09/09/2022   SpO2 98%   BMI 41.35 kg/m²      Physical Exam      Constitutional:  well developed; well nourished  no acute distress   ENT/Thyroid: not examined "   Eyes: Conjunctiva: clear   Respiratory:  breathing is unlabored  clear to auscultation bilaterally   Cardiovascular:  regular rate and rhythm   Chest:  Not performed.   Abdomen: gravid   : Not performed.   Musculoskeletal: Not performed   Skin: not performed.   Neuro: normal without focal findings and mental status, speech normal   Psych: oriented to time, place and person, mood and affect are within normal limits       Labs/Imaging   Latest Reference Range & Units 23 14:31 23 14:32   Glucose 70 - 130 mg/dL 116    Hemoglobin A1C %  5.5      Latest Reference Range & Units 23 12:01   TSH Baseline 0.270 - 4.200 uIU/mL 2.040   T4, Total 4.50 - 11.70 mcg/dL 10.40   Free T4 0.93 - 1.70 ng/dL 0.93     Diagnoses and all orders for this visit:    1. Gestational diabetes mellitus (GDM), antepartum, gestational diabetes method of control unspecified (Primary)  Currently 35 weeks gestation  Majority of glucose values at goal, discussed importance to monitor after every meal.  Reviewed with patient that she can check 1 hour postmeal, if needed.  Patient with concern for skin reaction at injection site.  Plan to transition to Lantus 10 units daily.  Reviewed titration instructions.  Patient will contact the clinic with any worsening symptoms.  Patient to continue metformin extended release 500 mg daily  Reviewed instructions for glycemic monitoring and the monitoring of urine ketones. Discussed that gestational diabetes can become harder to control as pregnancy progresses. Reviewed need for routine follow up from now until delivery.   Patient instructed to send glucose data weekly via [employer].  Risks of gestational diabetes were reviewed, these include, but are not limited to: LGA infant, macrosomia, stillbirth,  hypoglycemia, hyperbilirubinemia, birth injury,  birth, respiratory complications following delivery, polyhydramnios, hypocalcemia, maternal preeclampsia.  Nutritional goals reviewed:  Patient advised to eat 3 moderate sized meals daily as well as 2-4 snacks, including a bedtime snack. Discussed need for carbohydrate awareness. Patient given goals for carbohydrate intake for meals/snacks.  Glycemic Targets during pregnancy were reviewed, as follows: fasting glucose <95, 1 hour post prandial <140, 2 hour postprandial <120.  Tentatively plan for follow-up 3 months postpartum, sooner if needed.  Reviewed with patient that she will continue insulin therapy following delivery.  -     POC Glucose, Blood  -     POC Glycosylated Hemoglobin (Hb A1C)    2. Unable to lose weight  3. Other fatigue  Patient reports concern that this may be related to thyroid dysfunction.  We discussed that her most recent thyroid function testing was normal and thus the symptoms cannot be attributed to thyroid dysfunction.  Discussed that these are nonspecific symptoms which could have multiple potential causes.  Reviewed potential endocrine causes, will evaluate further after delivery.    Other orders  -     Insulin Glargine (Lantus SoloStar) 100 UNIT/ML injection pen; 10 units at bedtime, titrate per instruction, MDD 30 units  Dispense: 15 mL; Refill: 1  -     glucose blood test strip; Use as directed 4-6 times daily ok to change to ins formulary  Dispense: 200 each; Refill: 1         Return in about 4 months (around 9/11/2023) for Next scheduled follow up. The patient was instructed to contact the clinic with any interval questions or concerns.    Aniya Carrington MD   Endocrinologist    Dictated Utilizing Dragon Dictation

## 2023-05-15 ENCOUNTER — PRIOR AUTHORIZATION (OUTPATIENT)
Dept: ENDOCRINOLOGY | Facility: CLINIC | Age: 33
End: 2023-05-15
Payer: COMMERCIAL

## 2023-05-15 NOTE — TELEPHONE ENCOUNTER
Marisol Black (Key: [id number])  Rx #: [id number]  Lantus SoloStar 100UNIT/ML pen-injectors     [facility] [place] Medicaid ePA Form 2017 NCPDP  Created 4 days ago  Sent to Plan 3 days ago  Plan Response 3 days ago  Submit Clinical Questions 3 days ago  Determination 3 days ago  Message from Plan Prior Authorization is not required for this medication dosage form and strength at the quantity and days supply requested. This medication does not require a prior authorization because it is a covered benefit. Please have the pharmacy bill for the preferred product listed ABOVE in this letter. Please note: This applies to generic only.    Phar notified

## 2023-05-22 LAB — EXTERNAL GROUP B STREP ANTIGEN: NORMAL

## 2023-06-10 ENCOUNTER — HOSPITAL ENCOUNTER (INPATIENT)
Facility: HOSPITAL | Age: 33
LOS: 3 days | Discharge: HOME | End: 2023-06-13
Attending: OBSTETRICS & GYNECOLOGY | Admitting: OBSTETRICS & GYNECOLOGY
Payer: COMMERCIAL

## 2023-06-10 PROBLEM — O42.90 PROM (PREMATURE RUPTURE OF MEMBRANES): Status: ACTIVE | Noted: 2023-06-10

## 2023-06-10 LAB
ABO GROUP BLD: NORMAL
ALP SERPL-CCNC: 147 U/L (ref 39–117)
ALT SERPL W P-5'-P-CCNC: 9 U/L (ref 1–33)
AST SERPL-CCNC: 23 U/L (ref 1–32)
BILIRUB SERPL-MCNC: 0.3 MG/DL (ref 0–1.2)
BLD GP AB SCN SERPL QL: NEGATIVE
CREAT SERPL-MCNC: 0.48 MG/DL (ref 0.57–1)
DEPRECATED RDW RBC AUTO: 41.4 FL (ref 37–54)
ERYTHROCYTE [DISTWIDTH] IN BLOOD BY AUTOMATED COUNT: 13.4 % (ref 12.3–15.4)
GLUCOSE BLDC GLUCOMTR-MCNC: 102 MG/DL (ref 70–130)
HCT VFR BLD AUTO: 37 % (ref 34–46.6)
HGB BLD-MCNC: 12.5 G/DL (ref 12–15.9)
LDH SERPL-CCNC: 260 U/L (ref 135–214)
MCH RBC QN AUTO: 28.9 PG (ref 26.6–33)
MCHC RBC AUTO-ENTMCNC: 33.8 G/DL (ref 31.5–35.7)
MCV RBC AUTO: 85.6 FL (ref 79–97)
PLATELET # BLD AUTO: 179 10*3/MM3 (ref 140–450)
PMV BLD AUTO: 11.1 FL (ref 6–12)
POC AMNISURE: ABNORMAL
RBC # BLD AUTO: 4.32 10*6/MM3 (ref 3.77–5.28)
RH BLD: NEGATIVE
T&S EXPIRATION DATE: NORMAL
URATE SERPL-MCNC: 3.7 MG/DL (ref 2.4–5.7)
WBC NRBC COR # BLD: 9.08 10*3/MM3 (ref 3.4–10.8)

## 2023-06-10 PROCEDURE — 83615 LACTATE (LD) (LDH) ENZYME: CPT | Performed by: OBSTETRICS & GYNECOLOGY

## 2023-06-10 PROCEDURE — 84075 ASSAY ALKALINE PHOSPHATASE: CPT | Performed by: OBSTETRICS & GYNECOLOGY

## 2023-06-10 PROCEDURE — 86900 BLOOD TYPING SEROLOGIC ABO: CPT | Performed by: OBSTETRICS & GYNECOLOGY

## 2023-06-10 PROCEDURE — 82565 ASSAY OF CREATININE: CPT | Performed by: OBSTETRICS & GYNECOLOGY

## 2023-06-10 PROCEDURE — 82948 REAGENT STRIP/BLOOD GLUCOSE: CPT

## 2023-06-10 PROCEDURE — 84460 ALANINE AMINO (ALT) (SGPT): CPT | Performed by: OBSTETRICS & GYNECOLOGY

## 2023-06-10 PROCEDURE — 84550 ASSAY OF BLOOD/URIC ACID: CPT | Performed by: OBSTETRICS & GYNECOLOGY

## 2023-06-10 PROCEDURE — 85027 COMPLETE CBC AUTOMATED: CPT | Performed by: OBSTETRICS & GYNECOLOGY

## 2023-06-10 PROCEDURE — 84450 TRANSFERASE (AST) (SGOT): CPT | Performed by: OBSTETRICS & GYNECOLOGY

## 2023-06-10 PROCEDURE — 25010000002 PENICILLIN G POTASSIUM PER 600000 UNITS: Performed by: OBSTETRICS & GYNECOLOGY

## 2023-06-10 PROCEDURE — 82247 BILIRUBIN TOTAL: CPT | Performed by: OBSTETRICS & GYNECOLOGY

## 2023-06-10 PROCEDURE — 86901 BLOOD TYPING SEROLOGIC RH(D): CPT | Performed by: OBSTETRICS & GYNECOLOGY

## 2023-06-10 PROCEDURE — 86850 RBC ANTIBODY SCREEN: CPT | Performed by: OBSTETRICS & GYNECOLOGY

## 2023-06-10 PROCEDURE — 63710000001 INSULIN GLARGINE PER 5 UNITS: Performed by: OBSTETRICS & GYNECOLOGY

## 2023-06-10 PROCEDURE — 84112 EVAL AMNIOTIC FLUID PROTEIN: CPT | Performed by: OBSTETRICS & GYNECOLOGY

## 2023-06-10 RX ORDER — SODIUM CHLORIDE 0.9 % (FLUSH) 0.9 %
10 SYRINGE (ML) INJECTION AS NEEDED
Status: DISCONTINUED | OUTPATIENT
Start: 2023-06-10 | End: 2023-06-11

## 2023-06-10 RX ORDER — ONDANSETRON 2 MG/ML
4 INJECTION INTRAMUSCULAR; INTRAVENOUS EVERY 6 HOURS PRN
Status: DISCONTINUED | OUTPATIENT
Start: 2023-06-10 | End: 2023-06-11

## 2023-06-10 RX ORDER — SODIUM CHLORIDE 0.9 % (FLUSH) 0.9 %
10 SYRINGE (ML) INJECTION EVERY 12 HOURS SCHEDULED
Status: DISCONTINUED | OUTPATIENT
Start: 2023-06-10 | End: 2023-06-11

## 2023-06-10 RX ORDER — MAGNESIUM CARB/ALUMINUM HYDROX 105-160MG
30 TABLET,CHEWABLE ORAL ONCE
Status: DISCONTINUED | OUTPATIENT
Start: 2023-06-10 | End: 2023-06-11

## 2023-06-10 RX ORDER — LIDOCAINE HYDROCHLORIDE 10 MG/ML
5 INJECTION, SOLUTION EPIDURAL; INFILTRATION; INTRACAUDAL; PERINEURAL AS NEEDED
Status: DISCONTINUED | OUTPATIENT
Start: 2023-06-10 | End: 2023-06-11

## 2023-06-10 RX ORDER — FENTANYL CITRATE 50 UG/ML
50 INJECTION, SOLUTION INTRAMUSCULAR; INTRAVENOUS
Status: DISCONTINUED | OUTPATIENT
Start: 2023-06-10 | End: 2023-06-11

## 2023-06-10 RX ORDER — MISOPROSTOL 100 MCG
25 TABLET ORAL
Status: DISCONTINUED | OUTPATIENT
Start: 2023-06-10 | End: 2023-06-11

## 2023-06-10 RX ORDER — PENICILLIN G 3000000 [IU]/50ML
3 INJECTION, SOLUTION INTRAVENOUS EVERY 4 HOURS
Status: DISCONTINUED | OUTPATIENT
Start: 2023-06-10 | End: 2023-06-11

## 2023-06-10 RX ORDER — ONDANSETRON 4 MG/1
4 TABLET, FILM COATED ORAL EVERY 6 HOURS PRN
Status: DISCONTINUED | OUTPATIENT
Start: 2023-06-10 | End: 2023-06-11

## 2023-06-10 RX ORDER — SODIUM CHLORIDE, SODIUM LACTATE, POTASSIUM CHLORIDE, CALCIUM CHLORIDE 600; 310; 30; 20 MG/100ML; MG/100ML; MG/100ML; MG/100ML
125 INJECTION, SOLUTION INTRAVENOUS CONTINUOUS
Status: DISCONTINUED | OUTPATIENT
Start: 2023-06-10 | End: 2023-06-11

## 2023-06-10 RX ADMIN — INSULIN GLARGINE 18 UNITS: 100 INJECTION, SOLUTION SUBCUTANEOUS at 21:15

## 2023-06-10 RX ADMIN — SODIUM CHLORIDE 5 MILLION UNITS: 900 INJECTION INTRAVENOUS at 19:21

## 2023-06-10 RX ADMIN — SODIUM CHLORIDE, POTASSIUM CHLORIDE, SODIUM LACTATE AND CALCIUM CHLORIDE 125 ML/HR: 600; 310; 30; 20 INJECTION, SOLUTION INTRAVENOUS at 23:49

## 2023-06-10 RX ADMIN — PENICILLIN G 3 MILLION UNITS: 3000000 INJECTION, SOLUTION INTRAVENOUS at 23:49

## 2023-06-10 RX ADMIN — SODIUM CHLORIDE, POTASSIUM CHLORIDE, SODIUM LACTATE AND CALCIUM CHLORIDE 125 ML/HR: 600; 310; 30; 20 INJECTION, SOLUTION INTRAVENOUS at 19:10

## 2023-06-10 RX ADMIN — Medication 25 MCG: at 21:55

## 2023-06-10 NOTE — NURSING NOTE
Pt advised to removed facial piercings, risks of leaving them in reviewed and patient refused at this time because she was afraid it would close

## 2023-06-10 NOTE — NURSING NOTE
Charge RN to bedside.  RN discussed with patient that her 16 year old daughter could be a support person for her while she is in labor but a second adult support person must be in the room at all times when the 16 year is in the room.  The patient understands and agrees.  This charge RN also informed the patient the 16 year old daughter would not be able to spend the night on mother baby and would only be able to see patient during visiting hours.  The patient states her daughter has to go home and take care of the cats and understands.

## 2023-06-11 ENCOUNTER — ANESTHESIA (OUTPATIENT)
Dept: LABOR AND DELIVERY | Facility: HOSPITAL | Age: 33
End: 2023-06-11
Payer: COMMERCIAL

## 2023-06-11 ENCOUNTER — ANESTHESIA EVENT (OUTPATIENT)
Dept: LABOR AND DELIVERY | Facility: HOSPITAL | Age: 33
End: 2023-06-11
Payer: COMMERCIAL

## 2023-06-11 PROBLEM — Z34.90 PREGNANCY: Status: RESOLVED | Noted: 2023-03-29 | Resolved: 2023-06-11

## 2023-06-11 PROBLEM — O36.8130 DECREASED FETAL MOVEMENT AFFECTING MANAGEMENT OF PREGNANCY IN THIRD TRIMESTER, SINGLE OR UNSPECIFIED FETUS: Status: RESOLVED | Noted: 2023-05-06 | Resolved: 2023-06-11

## 2023-06-11 PROBLEM — O42.90 PROM (PREMATURE RUPTURE OF MEMBRANES): Status: RESOLVED | Noted: 2023-06-10 | Resolved: 2023-06-11

## 2023-06-11 LAB
AMPHET+METHAMPHET UR QL: NEGATIVE
AMPHETAMINES UR QL: NEGATIVE
BARBITURATES UR QL SCN: NEGATIVE
BENZODIAZ UR QL SCN: NEGATIVE
BUPRENORPHINE SERPL-MCNC: NEGATIVE NG/ML
CANNABINOIDS SERPL QL: POSITIVE
COCAINE UR QL: NEGATIVE
FENTANYL UR-MCNC: NEGATIVE NG/ML
GLUCOSE BLDC GLUCOMTR-MCNC: 103 MG/DL (ref 70–130)
GLUCOSE BLDC GLUCOMTR-MCNC: 72 MG/DL (ref 70–130)
GLUCOSE BLDC GLUCOMTR-MCNC: 88 MG/DL (ref 70–130)
GLUCOSE BLDC GLUCOMTR-MCNC: 92 MG/DL (ref 70–130)
GLUCOSE BLDC GLUCOMTR-MCNC: 95 MG/DL (ref 70–130)
METHADONE UR QL SCN: NEGATIVE
OPIATES UR QL: NEGATIVE
OXYCODONE UR QL SCN: NEGATIVE
PCP UR QL SCN: NEGATIVE
PROPOXYPH UR QL: NEGATIVE
TRICYCLICS UR QL SCN: NEGATIVE

## 2023-06-11 PROCEDURE — 59025 FETAL NON-STRESS TEST: CPT

## 2023-06-11 PROCEDURE — C1755 CATHETER, INTRASPINAL: HCPCS | Performed by: ANESTHESIOLOGY

## 2023-06-11 PROCEDURE — 51703 INSERT BLADDER CATH COMPLEX: CPT

## 2023-06-11 PROCEDURE — 25010000002 FENTANYL CITRATE (PF) 50 MCG/ML SOLUTION: Performed by: OBSTETRICS & GYNECOLOGY

## 2023-06-11 PROCEDURE — C1755 CATHETER, INTRASPINAL: HCPCS

## 2023-06-11 PROCEDURE — 80307 DRUG TEST PRSMV CHEM ANLYZR: CPT | Performed by: ADVANCED PRACTICE MIDWIFE

## 2023-06-11 PROCEDURE — 25010000002 MORPHINE PER 10 MG: Performed by: ADVANCED PRACTICE MIDWIFE

## 2023-06-11 PROCEDURE — 25010000002 FENTANYL CITRATE (PF) 50 MCG/ML SOLUTION: Performed by: ANESTHESIOLOGY

## 2023-06-11 PROCEDURE — 82948 REAGENT STRIP/BLOOD GLUCOSE: CPT

## 2023-06-11 PROCEDURE — 25010000002 ROPIVACAINE PER 1 MG: Performed by: ANESTHESIOLOGY

## 2023-06-11 PROCEDURE — 25010000002 PENICILLIN G POTASSIUM PER 600000 UNITS: Performed by: OBSTETRICS & GYNECOLOGY

## 2023-06-11 PROCEDURE — 25010000002 ONDANSETRON PER 1 MG: Performed by: OBSTETRICS & GYNECOLOGY

## 2023-06-11 RX ORDER — DOCUSATE SODIUM 100 MG/1
100 CAPSULE, LIQUID FILLED ORAL 2 TIMES DAILY
Status: DISCONTINUED | OUTPATIENT
Start: 2023-06-11 | End: 2023-06-13 | Stop reason: HOSPADM

## 2023-06-11 RX ORDER — OXYTOCIN/0.9 % SODIUM CHLORIDE 30/500 ML
2-20 PLASTIC BAG, INJECTION (ML) INTRAVENOUS
Status: DISCONTINUED | OUTPATIENT
Start: 2023-06-11 | End: 2023-06-11

## 2023-06-11 RX ORDER — DIPHENHYDRAMINE HYDROCHLORIDE 50 MG/ML
12.5 INJECTION INTRAMUSCULAR; INTRAVENOUS EVERY 8 HOURS PRN
Status: DISCONTINUED | OUTPATIENT
Start: 2023-06-11 | End: 2023-06-11

## 2023-06-11 RX ORDER — MISOPROSTOL 200 UG/1
800 TABLET ORAL ONCE AS NEEDED
Status: DISCONTINUED | OUTPATIENT
Start: 2023-06-11 | End: 2023-06-11 | Stop reason: HOSPADM

## 2023-06-11 RX ORDER — ZOLPIDEM TARTRATE 5 MG/1
5 TABLET ORAL NIGHTLY PRN
Status: DISCONTINUED | OUTPATIENT
Start: 2023-06-11 | End: 2023-06-13 | Stop reason: HOSPADM

## 2023-06-11 RX ORDER — OXYTOCIN/0.9 % SODIUM CHLORIDE 30/500 ML
PLASTIC BAG, INJECTION (ML) INTRAVENOUS
Status: COMPLETED
Start: 2023-06-11 | End: 2023-06-11

## 2023-06-11 RX ORDER — CARBOPROST TROMETHAMINE 250 UG/ML
250 INJECTION, SOLUTION INTRAMUSCULAR
Status: DISCONTINUED | OUTPATIENT
Start: 2023-06-11 | End: 2023-06-11 | Stop reason: HOSPADM

## 2023-06-11 RX ORDER — METHYLERGONOVINE MALEATE 0.2 MG/ML
200 INJECTION INTRAVENOUS ONCE AS NEEDED
Status: DISCONTINUED | OUTPATIENT
Start: 2023-06-11 | End: 2023-06-11 | Stop reason: HOSPADM

## 2023-06-11 RX ORDER — LIDOCAINE HYDROCHLORIDE AND EPINEPHRINE 15; 5 MG/ML; UG/ML
INJECTION, SOLUTION EPIDURAL AS NEEDED
Status: DISCONTINUED | OUTPATIENT
Start: 2023-06-11 | End: 2023-06-11 | Stop reason: SURG

## 2023-06-11 RX ORDER — FAMOTIDINE 10 MG/ML
20 INJECTION, SOLUTION INTRAVENOUS ONCE AS NEEDED
Status: DISCONTINUED | OUTPATIENT
Start: 2023-06-11 | End: 2023-06-11

## 2023-06-11 RX ORDER — TRISODIUM CITRATE DIHYDRATE AND CITRIC ACID MONOHYDRATE 500; 334 MG/5ML; MG/5ML
30 SOLUTION ORAL ONCE
Status: DISCONTINUED | OUTPATIENT
Start: 2023-06-11 | End: 2023-06-11

## 2023-06-11 RX ORDER — EPHEDRINE SULFATE 5 MG/ML
10 INJECTION INTRAVENOUS
Status: DISCONTINUED | OUTPATIENT
Start: 2023-06-11 | End: 2023-06-11

## 2023-06-11 RX ORDER — IBUPROFEN 600 MG/1
600 TABLET ORAL EVERY 6 HOURS PRN
Status: DISCONTINUED | OUTPATIENT
Start: 2023-06-11 | End: 2023-06-13 | Stop reason: HOSPADM

## 2023-06-11 RX ORDER — OXYTOCIN/0.9 % SODIUM CHLORIDE 30/500 ML
250 PLASTIC BAG, INJECTION (ML) INTRAVENOUS CONTINUOUS
Status: DISPENSED | OUTPATIENT
Start: 2023-06-11 | End: 2023-06-11

## 2023-06-11 RX ORDER — METOCLOPRAMIDE HYDROCHLORIDE 5 MG/ML
10 INJECTION INTRAMUSCULAR; INTRAVENOUS ONCE AS NEEDED
Status: DISCONTINUED | OUTPATIENT
Start: 2023-06-11 | End: 2023-06-11

## 2023-06-11 RX ORDER — INSULIN GLARGINE 100 [IU]/ML
18 INJECTION, SOLUTION SUBCUTANEOUS NIGHTLY
Status: DISCONTINUED | OUTPATIENT
Start: 2023-06-10 | End: 2023-06-11

## 2023-06-11 RX ORDER — MISOPROSTOL 100 MCG
50 TABLET ORAL ONCE
Status: DISCONTINUED | OUTPATIENT
Start: 2023-06-11 | End: 2023-06-11

## 2023-06-11 RX ORDER — PRENATAL VIT/IRON FUM/FOLIC AC 27MG-0.8MG
1 TABLET ORAL DAILY
Status: DISCONTINUED | OUTPATIENT
Start: 2023-06-12 | End: 2023-06-13 | Stop reason: HOSPADM

## 2023-06-11 RX ORDER — ROPIVACAINE HYDROCHLORIDE 2 MG/ML
15 INJECTION, SOLUTION EPIDURAL; INFILTRATION; PERINEURAL CONTINUOUS
Status: DISCONTINUED | OUTPATIENT
Start: 2023-06-11 | End: 2023-06-11

## 2023-06-11 RX ORDER — ACETAMINOPHEN 325 MG/1
650 TABLET ORAL EVERY 6 HOURS PRN
Status: DISCONTINUED | OUTPATIENT
Start: 2023-06-11 | End: 2023-06-13 | Stop reason: HOSPADM

## 2023-06-11 RX ORDER — ROPIVACAINE HYDROCHLORIDE 5 MG/ML
INJECTION, SOLUTION EPIDURAL; INFILTRATION; PERINEURAL AS NEEDED
Status: DISCONTINUED | OUTPATIENT
Start: 2023-06-11 | End: 2023-06-11 | Stop reason: SURG

## 2023-06-11 RX ORDER — HYDROCORTISONE 25 MG/G
1 CREAM TOPICAL AS NEEDED
Status: DISCONTINUED | OUTPATIENT
Start: 2023-06-11 | End: 2023-06-13 | Stop reason: HOSPADM

## 2023-06-11 RX ORDER — OXYTOCIN/0.9 % SODIUM CHLORIDE 30/500 ML
999 PLASTIC BAG, INJECTION (ML) INTRAVENOUS ONCE
Status: DISCONTINUED | OUTPATIENT
Start: 2023-06-11 | End: 2023-06-11 | Stop reason: HOSPADM

## 2023-06-11 RX ORDER — FENTANYL CITRATE 50 UG/ML
INJECTION, SOLUTION INTRAMUSCULAR; INTRAVENOUS AS NEEDED
Status: DISCONTINUED | OUTPATIENT
Start: 2023-06-11 | End: 2023-06-11 | Stop reason: SURG

## 2023-06-11 RX ORDER — ONDANSETRON 2 MG/ML
4 INJECTION INTRAMUSCULAR; INTRAVENOUS ONCE AS NEEDED
Status: DISCONTINUED | OUTPATIENT
Start: 2023-06-11 | End: 2023-06-11

## 2023-06-11 RX ADMIN — LIDOCAINE HYDROCHLORIDE AND EPINEPHRINE 2 ML: 15; 5 INJECTION, SOLUTION EPIDURAL at 15:37

## 2023-06-11 RX ADMIN — ZOLPIDEM TARTRATE 5 MG: 5 TABLET ORAL at 00:49

## 2023-06-11 RX ADMIN — MORPHINE SULFATE 4 MG: 4 INJECTION, SOLUTION INTRAMUSCULAR; INTRAVENOUS at 14:46

## 2023-06-11 RX ADMIN — Medication 50 MCG: at 08:06

## 2023-06-11 RX ADMIN — Medication 2 MILLI-UNITS/MIN: at 12:05

## 2023-06-11 RX ADMIN — DOCUSATE SODIUM 100 MG: 100 CAPSULE, LIQUID FILLED ORAL at 22:15

## 2023-06-11 RX ADMIN — PENICILLIN G 3 MILLION UNITS: 3000000 INJECTION, SOLUTION INTRAVENOUS at 12:26

## 2023-06-11 RX ADMIN — LIDOCAINE HYDROCHLORIDE AND EPINEPHRINE 3 ML: 15; 5 INJECTION, SOLUTION EPIDURAL at 15:35

## 2023-06-11 RX ADMIN — Medication 250 ML/HR: at 19:06

## 2023-06-11 RX ADMIN — PENICILLIN G 3 MILLION UNITS: 3000000 INJECTION, SOLUTION INTRAVENOUS at 16:07

## 2023-06-11 RX ADMIN — ROPIVACAINE HYDROCHLORIDE 7 ML: 5 INJECTION, SOLUTION EPIDURAL; INFILTRATION; PERINEURAL at 15:40

## 2023-06-11 RX ADMIN — FENTANYL CITRATE 100 MCG: 50 INJECTION, SOLUTION INTRAMUSCULAR; INTRAVENOUS at 15:40

## 2023-06-11 RX ADMIN — SODIUM CHLORIDE, POTASSIUM CHLORIDE, SODIUM LACTATE AND CALCIUM CHLORIDE 125 ML/HR: 600; 310; 30; 20 INJECTION, SOLUTION INTRAVENOUS at 09:32

## 2023-06-11 RX ADMIN — Medication 25 MCG: at 01:39

## 2023-06-11 RX ADMIN — PENICILLIN G 3 MILLION UNITS: 3000000 INJECTION, SOLUTION INTRAVENOUS at 04:59

## 2023-06-11 RX ADMIN — Medication 25 MCG: at 04:32

## 2023-06-11 RX ADMIN — IBUPROFEN 600 MG: 600 TABLET ORAL at 22:15

## 2023-06-11 RX ADMIN — SODIUM CHLORIDE, POTASSIUM CHLORIDE, SODIUM LACTATE AND CALCIUM CHLORIDE 1000 ML: 600; 310; 30; 20 INJECTION, SOLUTION INTRAVENOUS at 15:46

## 2023-06-11 RX ADMIN — FENTANYL CITRATE 50 MCG: 50 INJECTION, SOLUTION INTRAMUSCULAR; INTRAVENOUS at 01:58

## 2023-06-11 RX ADMIN — ROPIVACAINE HYDROCHLORIDE 15 ML/HR: 2 INJECTION, SOLUTION EPIDURAL; INFILTRATION at 15:43

## 2023-06-11 RX ADMIN — PENICILLIN G 3 MILLION UNITS: 3000000 INJECTION, SOLUTION INTRAVENOUS at 08:06

## 2023-06-11 RX ADMIN — ONDANSETRON 4 MG: 2 INJECTION INTRAMUSCULAR; INTRAVENOUS at 14:32

## 2023-06-11 NOTE — ANESTHESIA PREPROCEDURE EVALUATION
Anesthesia Evaluation     Patient summary reviewed and Nursing notes reviewed                Airway   Mallampati: II  TM distance: >3 FB  Neck ROM: full  No difficulty expected  Dental    (+) partials        Pulmonary    (+) asthma,  Cardiovascular - negative cardio ROS        Neuro/Psych  (+) psychiatric history Anxiety and Bipolar  GI/Hepatic/Renal/Endo    (+) obesity, morbid obesity, diabetes mellitus, thyroid problem hypothyroidism    Musculoskeletal (-) negative ROS    Abdominal   (+) obese   Substance History - negative use     OB/GYN    (+) Pregnant        Other - negative ROS                     Anesthesia Plan    ASA 3     epidural         CODE STATUS:    Code Status (Patient has no pulse and is not breathing): CPR (Attempt to Resuscitate)  Medical Interventions (Patient has pulse or is breathing): Full Support

## 2023-06-11 NOTE — PROGRESS NOTES
"06/11/23  14:42 EDT  Marisol Black      ASSESSMENTS:Getting uncomfortable. Breathing thru contractions.  Nauseated and requesting pain medication.    /90   Pulse 86   Temp 98 °F (36.7 °C)   Resp 16   Ht 177.8 cm (70\")   Wt 129 kg (285 lb)   LMP 09/09/2022   Breastfeeding Yes   BMI 40.89 kg/m²     Fetal Heart Rate Assessment   Method: Fetal HR Assessment Method: Telemetry/Wireless Fetal HR Monitor   Beats/min: Fetal HR (beats/min): 135   Baseline: Fetal HR Baseline: normal range   Varibility: Fetal HR Variability: moderate (amplitude range 6 to 25 bpm)   Accels: Fetal HR Accelerations: greater than/equal to 15 bpm, lasting at least 15 seconds   Decels: Fetal HR Decelerations: variable   Tracing Category:       Uterine Assessment   Method: Method: Telemetry/Wireless Uterine Activity Monitor   Frequency (min): Contraction Frequency (Minutes): 2-3   Ctx Count in 10 min:     Duration:     Intensity: Contraction Intensity: mild by palpation   Intensity by IUPC:     Resting Tone: Uterine Resting Tone: soft by palpation   Resting Tone by IUPC:     [place] Units:                                Presentation: Vertex   Cervix: Exam by: Method: sterile exam per CNM   Dilation: Cervical Dilation (cm): 4-5   Effacement: Cervical Effacement: 80%   Station: Fetal Station: -1            Lab Results   Component Value Date    WBC 9.08 06/10/2023    HGB 12.5 06/10/2023    HCT 37.0 06/10/2023    MCV 85.6 06/10/2023     06/10/2023    URICACID 3.7 06/10/2023    AST 23 06/10/2023    ALT 9 06/10/2023     (H) 06/10/2023     Results from last 7 days   Lab Units 06/10/23  1946   ABO TYPING  O   RH TYPING  Negative   ANTIBODY SCREEN  Negative       PLAN:Morphine 4 MG IV q 4 hours PRN. Continue present management.    Ramila Barnett CNM  14:42 EDT  06/11/23  "

## 2023-06-11 NOTE — ANESTHESIA PROCEDURE NOTES
Labor Epidural      Patient reassessed immediately prior to procedure    Patient location during procedure: OB  Performed By  Anesthesiologist: Daphnie Johnson DO  Preanesthetic Checklist  Completed: patient identified, IV checked, risks and benefits discussed, surgical consent, monitors and equipment checked, pre-op evaluation and timeout performed  Additional Notes  CSE performed using 25g Ronny  Prep:  Pt Position:sitting  Sterile Tech:cap, gloves, mask and sterile barrier  Prep:DuraPrep  Monitoring:blood pressure monitoring  Epidural Block Procedure:  Approach:midline  Guidance:palpation technique  Location:L3-L4  Needle Type:Tuohy  Needle Gauge:17 G  Loss of Resistance Medium: air  Loss of Resistance: 6cm  Cath Depth at skin:13 cm  Paresthesia: none  Aspiration:negative  Test Dose:negative  Number of Attempts: 1  Post Assessment:  Dressing:occlusive dressing applied and secured with tape  Pt Tolerance:patient tolerated the procedure well with no apparent complications  Complications:no

## 2023-06-11 NOTE — PROGRESS NOTES
"06/11/23  11:49 EDT  Marisol Black      ASSESSMENTS: Reports not feeling any contractions after last dose Cytotec.    /89   Pulse 85   Temp 97.7 °F (36.5 °C) (Oral)   Resp 16   Ht 177.8 cm (70\")   Wt 129 kg (285 lb)   LMP 09/09/2022   Breastfeeding Yes   BMI 40.89 kg/m²     Fetal Heart Rate Assessment   Method: Fetal HR Assessment Method: Telemetry/Wireless Fetal HR Monitor   Beats/min: Fetal HR (beats/min): 135   Baseline: Fetal HR Baseline: normal range   Varibility: Fetal HR Variability: moderate (amplitude range 6 to 25 bpm)   Accels: Fetal HR Accelerations: greater than/equal to 15 bpm, lasting at least 15 seconds   Decels: Fetal HR Decelerations: variable   Tracing Category:       Uterine Assessment   Method: Method: Telemetry/Wireless Uterine Activity Monitor   Frequency (min): Contraction Frequency (Minutes): 1-9   Ctx Count in 10 min:     Duration:     Intensity: Contraction Intensity: mild by palpation   Intensity by IUPC:     Resting Tone: Uterine Resting Tone: soft by palpation   Resting Tone by IUPC:          Presentation: Vertex   Cervix: Exam by: Method: sterile exam per CNM   Dilation: Cervical Dilation (cm): 3-4   Effacement: Cervical Effacement: 60-70%   Station: Fetal Station: -2            Lab Results   Component Value Date    WBC 9.08 06/10/2023    HGB 12.5 06/10/2023    HCT 37.0 06/10/2023    MCV 85.6 06/10/2023     06/10/2023    URICACID 3.7 06/10/2023    AST 23 06/10/2023    ALT 9 06/10/2023     (H) 06/10/2023     Results from last 7 days   Lab Units 06/10/23  1946   ABO TYPING  O   RH TYPING  Negative   ANTIBODY SCREEN  Negative       PLAN:Discussed need to start pitocin at this time.  She agrees with plant at this time.    Ramila Barnett CNM  11:49 EDT  06/11/23  "

## 2023-06-11 NOTE — L&D DELIVERY NOTE
[facility]   Vaginal Delivery Note    Patient Name: Marisol Black  : 1990  MRN: [id number]    Date of Delivery: 2023     Diagnosis     Pre & Post-Delivery:  Intrauterine pregnancy at 39w2d  Labor status: Spontaneous Onset of Labor     Normal spontaneous vaginal delivery             Problem List    Transfer to Postpartum     Review the Delivery Report for details.     Delivery     Delivery: Vaginal, Spontaneous     YOB: 2025    Time of Birth:  Gestational Age 6:01 PM   39w2d     Anesthesia: Epidural     Delivering clinician: Ramila Barnett CNM   Forceps?   No   Vacuum? No    Shoulder dystocia present: No        Delivery narrative:  Marisol pushed well to a spontaneous vaginal delivery of an viable male infant in OA position over intact perineum, with labor epidural.  Mouth and nose bulb suctioned after delivery of head. Shoulders and body delivered easily atraumatically. Vigorous infant placed on mothers abdomen and dried.  Cord was allowed to cease pulsation, double clamped, cut by patient. Cord blood and cord segment obtained.  Spontaneous delivery of intact placenta with 3 vessel cord. Fundus firm, lochia light rubra.  Perineal and vaginal inspection revealed no laceration.  Mother and son stable in the immediate PP period.       Infant     Findings: male  infant     Infant observations: Weight: 3460 g (7 lb 10.1 oz)   Length: 19  in  Observations/Comments:        Apgars: 8  @ 1 minute /    9  @ 5 minutes   Infant Name: Jozhia     Placenta & Cord         Placenta delivered  Spontaneous  at        Cord: 3 vessels  present.   Nuchal Cord?  yes; Number of nuchal loops present:  1    Cord blood obtained: Yes    Cord gases obtained:  No      Repair     Episiotomy: None     No    Lacerations: No   Estimated Blood Loss: Est. Blood Loss (mL): 50 mL (Filed from Delivery Summary) ([id number])     Quantitative Blood Loss:          Complications     none    Disposition      Mother to Mother Baby/Postpartum  in stable condition currently.  Baby to remains with mom  in stable condition currently.    Ramila Barnett CNM  06/11/23  18:21 EDT

## 2023-06-11 NOTE — H&P
Ced  Obstetric History and Physical    Chief Complaint   Patient presents with    Rupture of Membranes       Subjective     Patient is a 32 y.o. female  currently at 39w2d, who presented last evening with SROM @ 1500. Fluid clear. Baby active. She refused pitocin and had discussed Cytotec with provider.  She has now had 3 doses cytotec without active labor.    Her prenatal care is complicated by  fetal anomalies  Spina Bifida .  Her previous obstetric/gynecological history is noted for previous term  x 3.    The following portions of the patients history were reviewed and updated as appropriate: current medications, allergies, past medical history, past surgical history, past family history, past social history, and problem list .       Prenatal Information:       External Prenatal Results       Pregnancy Outside Results - Transcribed From Office Records - See Scanned Records For Details       Test Value Date Time    ABO  O  06/10/23 1946    Rh  Negative  06/10/23 194    Antibody Screen  Negative  06/10/23 1946       Negative  23 1117       Positive  22 1412    Varicella IgG  773 index 22 1412    Rubella  1.02 index 22 1412    Hgb  12.5 g/dL 06/10/23 1900       12.0 g/dL 23 1223       12.6 g/dL 22 1412    Hct  37.0 % 06/10/23 1900       35.0 % 23 1223       36.8 % 22 1412    Glucose Fasting GTT       Glucose Tolerance Test 1 hour       Glucose Tolerance Test 3 hour       Gonorrhea (discrete)  Negative  10/31/16 1041    Chlamydia (discrete)  Negative  10/31/16 1041    RPR       VDRL       Syphilis Antibody       HBsAg  Non-Reactive  22 1412    Herpes Simplex Virus PCR       Herpes Simplex VIrus Culture       HIV  Non-Reactive  22 1412    Hep C RNA Quant PCR       Hep C Antibody  Non-Reactive  22 1412    AFP       Group B Strep       GBS Susceptibility to Clindamycin       GBS Susceptibility to Erythromycin       Fetal Fibronectin     "   Genetic Testing, Maternal Blood                 Drug Screening       Test Value Date Time    Urine Drug Screen       Amphetamine Screen  Negative  22 1412    Barbiturate Screen  Negative  22 1412    Benzodiazepine Screen  Negative  22 1412    Methadone Screen  Negative  22 1412    Phencyclidine Screen  Negative  22 1412    Opiates Screen  Negative  22 1412    THC Screen  Positive  22 1412    Cocaine Screen       Propoxyphene Screen  Negative  22 1412    Buprenorphine Screen  Negative  22 1412    Methamphetamine Screen       Oxycodone Screen  Negative  22 1412    Tricyclic Antidepressants Screen  Negative  22 1412              Legend    ^: Historical                              Past OB History:       OB History    Para Term  AB Living   5 3 3 0 1 3   SAB IAB Ectopic Molar Multiple Live Births   0 0 0 0 0 3      # Outcome Date GA Lbr Gonsalo/2nd Weight Sex Delivery Anes PTL Lv   5 Current            4 Term 12 40w0d  3062 g (6 lb 12 oz) F Vag-Spont EPI N GERMAINE   3 AB 11 10w0d    SAB         Birth Comments: Had large amount of bleeding at home, got a blood transfusion - had D & c   2 Term 09 37w0d  3459 g (7 lb 10 oz) M Vag-Spont EPI Y GERMAINE      Birth Comments: during labor \" lost babies heart beat, did not have heart rate for 6 minutes post birth- went to \"   pulmonary stenosis      Complications: Fetal Intolerance   1 Term 03/15/07 38w0d  3459 g (7 lb 10 oz) F Vag-Spont Combined spi N GERMAINE      Obstetric Comments   Fob # 1 - Pregnancy #1 NIPT - normal    Fob #2 - Pregnancy #2, #3, and #4  - all testing normal    Fob #3 - Pregnancy #5 - NIPT low risk - male        Past Medical History: Past Medical History:   Diagnosis Date    Asthma     as a kid, pt reports \"I don't remeber ever not having an inhaler\"    Asthma due to seasonal allergies     Depression 2007    Disease of thyroid gland     pt states \"I am concerned about it, " "but no official diagnosis or meds\"    Gestational diabetes     History of bipolar disorder 2007    History of blood transfusion 2011    post miscarriage    Miscarriage     2011- pt reports getting blood transfusion      Past Surgical History Past Surgical History:   Procedure Laterality Date    DILATION AND CURETTAGE, DIAGNOSTIC / THERAPEUTIC      2011- bleeding- blood transfusion, early miscarriage    WISDOM TOOTH EXTRACTION        Family History: Family History   Problem Relation Age of Onset    Hypertension Mother     Obesity Sister     Asthma Daughter     Heart defect Son       Social History:  reports that she quit smoking about 3 years ago. Her smoking use included cigarettes. She has never used smokeless tobacco.   reports that she does not currently use alcohol.   reports that she does not currently use drugs after having used the following drugs: Marijuana.        General ROS: Pertinent items are noted in HPI, all other systems reviewed and negative    Objective     Vitals:    06/11/23 0713   BP: 134/89   Pulse: 85   Resp: 16   Temp: 97.7 °F (36.5 °C)     Weight: Weight:  [129 kg (285 lb)] 129 kg (285 lb)     Physical Examination:   General Appearance: alert, well appearing, in no apparent distress  Lungs: clear to auscultation, no wheezes, rales or rhonchi, symmetric air entry  Heart: regular rate and rhythm, no murmurs  Abdomen: FHT present  Back exam: no CVA tenderness   Extremities: no redness or tenderness in the calves or thighs, no edema  Skin: normal coloration and turgor, no rashes      Presentation: [id number]   Cervix: Exam by: Method: sterile exam per RN   Dilation: Cervical Dilation (cm): 3-4   Effacement: Cervical Effacement: 60-70%   Station: Fetal Station: -2        Fetal Heart Rate Assessment   Method: Fetal HR Assessment Method: external   Beats/min: Fetal HR (beats/min): 130   Baseline: Fetal HR Baseline: normal range   Varibility: Fetal HR Variability: moderate (amplitude range 6 to 25 bpm) "   Accels: Fetal HR Accelerations: greater than/equal to 15 bpm, lasting at least 15 seconds   Decels: Fetal HR Decelerations: absent   Tracing Category:  1     Uterine Assessment   Method: Method: Telemetry/Wireless Uterine Activity Monitor   Frequency (min): Contraction Frequency (Minutes): 2-5.5   Ctx Count in 10 min: Contractions in 10 Minutes: poor tracing   Duration:     Intensity: Contraction Intensity: mild by palpation   Intensity by IUPC:     Resting Tone: Uterine Resting Tone: soft by palpation   Resting Tone by IUPC:     [place] Units:       Laboratory Results: Labs reviewed        PROM (premature rupture of membranes)        Assessment:  1.  Intrauterine pregnancy at 39w2d weeks gestation with reactive, reassuring fetal status.    2.  Prodromal labor  with ROM  3.  Obstetrical history is remarkable for  Infant with closed Spina Bifida.  4.  GBS status: Positive    Plan:  1. fetal and uterine monitoring  continuously, labor augmentation  Misoprostol, analgesia with  parenteral narcotics, and antibiotic for GBS  2. Plan of care has been reviewed with patient   3.  4th dose of Cytotec placed, 50 mcg this dose. Discussed need for change in plan if not actively laboring in 4 hours.  4.  All questions have been answered.        Ramila Barnett CNM  6/11/2023  07:45 EDT

## 2023-06-12 LAB
ABO GROUP BLD: NORMAL
BASOPHILS # BLD AUTO: 0.04 10*3/MM3 (ref 0–0.2)
BASOPHILS NFR BLD AUTO: 0.4 % (ref 0–1.5)
DEPRECATED RDW RBC AUTO: 41.1 FL (ref 37–54)
EOSINOPHIL # BLD AUTO: 0.1 10*3/MM3 (ref 0–0.4)
EOSINOPHIL NFR BLD AUTO: 0.9 % (ref 0.3–6.2)
ERYTHROCYTE [DISTWIDTH] IN BLOOD BY AUTOMATED COUNT: 13.4 % (ref 12.3–15.4)
FETAL BLEED: NEGATIVE
GLUCOSE BLDC GLUCOMTR-MCNC: 119 MG/DL (ref 70–130)
GLUCOSE BLDC GLUCOMTR-MCNC: 83 MG/DL (ref 70–130)
HCT VFR BLD AUTO: 35.9 % (ref 34–46.6)
HGB BLD-MCNC: 12.2 G/DL (ref 12–15.9)
IMM GRANULOCYTES # BLD AUTO: 0.05 10*3/MM3 (ref 0–0.05)
IMM GRANULOCYTES NFR BLD AUTO: 0.5 % (ref 0–0.5)
LYMPHOCYTES # BLD AUTO: 2.15 10*3/MM3 (ref 0.7–3.1)
LYMPHOCYTES NFR BLD AUTO: 19.6 % (ref 19.6–45.3)
MCH RBC QN AUTO: 28.8 PG (ref 26.6–33)
MCHC RBC AUTO-ENTMCNC: 34 G/DL (ref 31.5–35.7)
MCV RBC AUTO: 84.7 FL (ref 79–97)
MONOCYTES # BLD AUTO: 0.92 10*3/MM3 (ref 0.1–0.9)
MONOCYTES NFR BLD AUTO: 8.4 % (ref 5–12)
NEUTROPHILS NFR BLD AUTO: 7.73 10*3/MM3 (ref 1.7–7)
NEUTROPHILS NFR BLD AUTO: 70.2 % (ref 42.7–76)
NRBC BLD AUTO-RTO: 0 /100 WBC (ref 0–0.2)
NUMBER OF DOSES: NORMAL
PLATELET # BLD AUTO: 140 10*3/MM3 (ref 140–450)
PMV BLD AUTO: 10.9 FL (ref 6–12)
RBC # BLD AUTO: 4.24 10*6/MM3 (ref 3.77–5.28)
RH BLD: NEGATIVE
WBC NRBC COR # BLD: 10.99 10*3/MM3 (ref 3.4–10.8)

## 2023-06-12 PROCEDURE — 85461 HEMOGLOBIN FETAL: CPT

## 2023-06-12 PROCEDURE — 86901 BLOOD TYPING SEROLOGIC RH(D): CPT

## 2023-06-12 PROCEDURE — 82948 REAGENT STRIP/BLOOD GLUCOSE: CPT

## 2023-06-12 PROCEDURE — 25010000002 RHO D IMMUNE GLOBULIN 1500 UNIT/2ML SOLUTION PREFILLED SYRINGE

## 2023-06-12 PROCEDURE — 86900 BLOOD TYPING SEROLOGIC ABO: CPT

## 2023-06-12 PROCEDURE — 85025 COMPLETE CBC W/AUTO DIFF WBC: CPT | Performed by: ADVANCED PRACTICE MIDWIFE

## 2023-06-12 RX ADMIN — IBUPROFEN 600 MG: 600 TABLET ORAL at 23:56

## 2023-06-12 RX ADMIN — DOCUSATE SODIUM 100 MG: 100 CAPSULE, LIQUID FILLED ORAL at 10:23

## 2023-06-12 RX ADMIN — ACETAMINOPHEN 650 MG: 325 TABLET ORAL at 02:14

## 2023-06-12 RX ADMIN — IBUPROFEN 600 MG: 600 TABLET ORAL at 17:58

## 2023-06-12 RX ADMIN — PRENATAL VITAMINS-IRON FUMARATE 27 MG IRON-FOLIC ACID 0.8 MG TABLET 1 TABLET: at 10:23

## 2023-06-12 RX ADMIN — DOCUSATE SODIUM 100 MG: 100 CAPSULE, LIQUID FILLED ORAL at 18:01

## 2023-06-12 RX ADMIN — IBUPROFEN 600 MG: 600 TABLET ORAL at 03:45

## 2023-06-12 RX ADMIN — IBUPROFEN 600 MG: 600 TABLET ORAL at 10:23

## 2023-06-12 RX ADMIN — HUMAN RHO(D) IMMUNE GLOBULIN 1500 UNITS: 1500 SOLUTION INTRAMUSCULAR; INTRAVENOUS at 03:45

## 2023-06-12 NOTE — PAYOR COMM NOTE
"Serena Shine (32 y.o. Female)       Date of Birth   1990    Social Security Number       Address   [address] [place] [address]    Home Phone   [phone]    MRN   [id number]       Voodoo   None    Marital Status   Single                            Admission Date   6/10/23    Admission Type   Elective    Admitting Provider   Mihaela Lacy MD    Attending Provider   Krystal Rivera CNM    Department, Room/Bed   [facility] MOTHER BABY 4A, N407/1       Discharge Date       Discharge Disposition       Discharge Destination                                 Attending Provider: Krystal Rivera CNM    Allergies: Demerol [Meperidine], Lexapro [Escitalopram Oxalate]    Isolation: None   Infection: None   Code Status: CPR    Ht: 177.8 cm (70\")   Wt: 129 kg (285 lb)    Admission Cmt: None   Principal Problem: None                  Active Insurance as of 6/10/2023       Primary Coverage       Payor Plan Insurance Group Employer/Plan Group    [employer] [place] [employer] [place]ICA[place]        Payor Plan Address Payor Plan Phone Number Payor Plan Fax Number Effective Dates    [address] [address] [phone]  2019 - None Entered    [facility] [id number]         Subscriber Name Subscriber Birth Date Member ID       SERENA SHINE 1990                     Emergency Contacts        (Rel.) Home Phone Work Phone Mobile Phone    MAURICE NELSON (Friend) -- -- [phone]                 Operative/Procedure Notes (last 48 hours)        Ramila Barnett CNM at 23 1821           [facility]   Vaginal Delivery Note    Patient Name: Serena Shine  : 1990  MRN: [id number]    Date of Delivery: 2023     Diagnosis     Pre & Post-Delivery:  Intrauterine pregnancy at 39w2d  Labor status: Spontaneous Onset of Labor     Normal spontaneous vaginal delivery             Problem List    Transfer to Postpartum     Review the Delivery Report for " details.     Delivery     Delivery: Vaginal, Spontaneous     YOB: 2025    Time of Birth:  Gestational Age 6:01 PM   39w2d     Anesthesia: Epidural     Delivering clinician: Ramila Barnett CNM   Forceps?   No   Vacuum? No    Shoulder dystocia present: No        Delivery narrative:  Marisol pushed well to a spontaneous vaginal delivery of an viable male infant in OA position over intact perineum, with labor epidural.  Mouth and nose bulb suctioned after delivery of head. Shoulders and body delivered easily atraumatically. Vigorous infant placed on mothers abdomen and dried.  Cord was allowed to cease pulsation, double clamped, cut by patient. Cord blood and cord segment obtained.  Spontaneous delivery of intact placenta with 3 vessel cord. Fundus firm, lochia light rubra.  Perineal and vaginal inspection revealed no laceration.  Mother and son stable in the immediate PP period.       Infant     Findings: male  infant     Infant observations: Weight: 3460 g (7 lb 10.1 oz)   Length: 19  in  Observations/Comments:        Apgars: 8  @ 1 minute /    9  @ 5 minutes   Infant Name: Camila     Placenta & Cord         Placenta delivered  Spontaneous  at        Cord: 3 vessels  present.   Nuchal Cord?  yes; Number of nuchal loops present:  1    Cord blood obtained: Yes    Cord gases obtained:  No      Repair     Episiotomy: None     No    Lacerations: No   Estimated Blood Loss: Est. Blood Loss (mL): 50 mL (Filed from Delivery Summary) (06/11/23 1801)     Quantitative Blood Loss:          Complications     none    Disposition     Mother to Mother Baby/Postpartum  in stable condition currently.  Baby to remains with mom  in stable condition currently.    Ramila Barnett CNM  06/11/23  18:21 EDT          Electronically signed by Ramila Barnett CNM at 06/11/23 2016       Physician Progress Notes (last 24 hours)  Notes from 06/10/23 2203 through 06/11/23 2203   No notes of this type exist for this  encounter.

## 2023-06-12 NOTE — PROGRESS NOTES
6/12/2023  PPD #1    Subjective   Marisol feels well.  Patient describes her lochia as less than menses.  Pain is well controlled       Objective   Temp: Temp:  [97.5 °F (36.4 °C)-98 °F (36.7 °C)] 97.5 °F (36.4 °C) Temp src: Oral   BP: BP: (112-169)/(56-99) 112/69        Pulse: Heart Rate:  [] 76  RR: Resp:  [16-22] 16    General:  No acute distress   Abdomen: Fundus firm and beneath umbilicus   Pelvis: deferred     Lab Results   Component Value Date    WBC 10.99 (H) 06/12/2023    HGB 12.2 06/12/2023    HCT 35.9 06/12/2023    MCV 84.7 06/12/2023     06/12/2023    URICACID 3.7 06/10/2023    AST 23 06/10/2023    ALT 9 06/10/2023     (H) 06/10/2023    HEPBSAG Non-Reactive 11/30/2022     Results from last 7 days   Lab Units 06/12/23  0203 06/10/23  1946   ABO TYPING  O O   RH TYPING  Negative Negative   ANTIBODY SCREEN   --  Negative       Assessment  1. PPD# 1 after vaginal delivery    Plan  1. Supportive care, discharge as clinically indicated.       This note has been electronically signed.    Krystal Rivera CNM  08:50 EDT  June 12, 2023

## 2023-06-12 NOTE — LACTATION NOTE
06/12/23 1155   Maternal Information   Date of Referral 06/12/23   Person Making Referral lactation consultant  (courtesy visit, newly postpartum)   Maternal Reason for Referral other (see comments)  (pt reports she /pumped for previous 3 children from 2 months to 1 year each)   Infant Reason for Referral other (see comments)  (pt reports breastfeeding this child is going very well; readjusting for shallow latch attempts)   Maternal Infant Feeding   Maternal Emotional State independent;receptive;relaxed   Pain with Feeding no  (per pt report)   Milk Expression/Equipment   Equipment for Home Use breast pump ordered through insurance  ([employer])   Breast Pumping   Breast Pumping Interventions post-feed pumping encouraged  (for short/missed feedings, if supplementation is required, or if breastfeeding becomes too painful, to encourage breastmilk production)     Completed breastfeeding education encouraging pt to achieve a deep, comfortable latch throughout breastfeeding, which should be at least every 3 hours while giving baby stimulation for high quality transfer of breastmilk. Alternatively, pumping encouraged every three hours, or at baby's feeding times for optimal milk initiation/production. All questions answered at this time, PRN Lactation Consultant/Clinic contact encouraged.

## 2023-06-12 NOTE — ANESTHESIA POSTPROCEDURE EVALUATION
Patient: Marisol Black    Procedure Summary       Date: 06/11/23 Room / Location:     Anesthesia Start: 1525 Anesthesia Stop: 1806    Procedure: LABOR ANALGESIA Diagnosis:     Scheduled Providers:  Provider: Daphnie Johnson DO    Anesthesia Type: epidural ASA Status: 3            Anesthesia Type: epidural    Vitals  Vitals Value Taken Time   /69 06/12/23 0718   Temp 97.5 °F (36.4 °C) 06/12/23 0718   Pulse 76 06/12/23 0718   Resp 16 06/12/23 0718   SpO2             Post Anesthesia Care and Evaluation    Patient location during evaluation: bedside  Patient participation: complete - patient participated  Level of consciousness: awake  Pain score: 0  Pain management: satisfactory to patient    Airway patency: patent  Anesthetic complications: No anesthetic complications  PONV Status: none  Cardiovascular status: acceptable and hemodynamically stable  Respiratory status: acceptable  Hydration status: acceptable  Post Neuraxial Block status: Motor and sensory function returned to baseline and No signs or symptoms of PDPH

## 2023-06-13 VITALS
BODY MASS INDEX: 40.8 KG/M2 | HEART RATE: 77 BPM | TEMPERATURE: 97.6 F | SYSTOLIC BLOOD PRESSURE: 120 MMHG | RESPIRATION RATE: 16 BRPM | WEIGHT: 285 LBS | HEIGHT: 70 IN | DIASTOLIC BLOOD PRESSURE: 73 MMHG

## 2023-06-13 RX ORDER — ACETAMINOPHEN 325 MG/1
650 TABLET ORAL EVERY 6 HOURS PRN
Start: 2023-06-13

## 2023-06-13 RX ORDER — IBUPROFEN 600 MG/1
600 TABLET ORAL EVERY 6 HOURS PRN
Qty: 30 TABLET | Refills: 1 | Status: SHIPPED | OUTPATIENT
Start: 2023-06-13

## 2023-06-13 RX ORDER — DOCUSATE SODIUM 100 MG/1
100 CAPSULE, LIQUID FILLED ORAL 2 TIMES DAILY PRN
Qty: 30 CAPSULE | Refills: 1 | Status: SHIPPED | OUTPATIENT
Start: 2023-06-13

## 2023-06-13 RX ORDER — HYDROCORTISONE 25 MG/G
1 CREAM TOPICAL AS NEEDED
Start: 2023-06-13

## 2023-06-13 RX ADMIN — IBUPROFEN 600 MG: 600 TABLET ORAL at 05:57

## 2023-06-13 NOTE — DISCHARGE SUMMARY
[facility]  Vaginal Delivery Discharge Summary      Patient: Marisol Black      MR#:[id number]  Admission  Diagnosis: 39 weeks 2 days gestation, Premature ROM without labor,  Discharge Diagnosis: Spontaneous vaginal delivery    Date of Admission: 6/10/2023  Date of Discharge:  6/13/2023    Procedures:  Vaginal, Spontaneous     6/11/2023    6:01 PM      Service:  Obstetrics    Hospital Course:  Patient underwent vaginal delivery and remained in the hospital for 3 days.  During that time she remained afebrile and hemodynamically stable.  On the day of discharge, she was eating, ambulating and voiding without difficulty.      Lab Results   Component Value Date    WBC 10.99 (H) 06/12/2023    HGB 12.2 06/12/2023    HCT 35.9 06/12/2023    MCV 84.7 06/12/2023     06/12/2023    URICACID 3.7 06/10/2023    AST 23 06/10/2023    ALT 9 06/10/2023     (H) 06/10/2023     Results from last 7 days   Lab Units 06/12/23  0203 06/10/23  1946   ABO TYPING  O O   RH TYPING  Negative Negative   ANTIBODY SCREEN   --  Negative       Discharge Medications     Discharge Medications        New Medications        Instructions Start Date   acetaminophen 325 MG tablet  Commonly known as: TYLENOL   650 mg, Oral, Every 6 Hours PRN      benzocaine-menthol 20-0.5 % aerosol topical spray  Commonly known as: DERMOPLAST   Topical, As Needed      Hydrocortisone (Perianal) 2.5 % rectal cream  Commonly known as: ANUSOL-HC   1 application, Rectal, As Needed      ibuprofen 600 MG tablet  Commonly known as: ADVIL,MOTRIN   600 mg, Oral, Every 6 Hours PRN      lanolin topical   1 application, Topical, Every 1 Hour PRN      witch hazel-glycerin pad  Commonly known as: TUCKS   1 pad, Topical, As Needed             Changes to Medications        Instructions Start Date   docusate sodium 100 MG capsule  Commonly known as: COLACE  What changed:   when to take this  reasons to take this   100 mg, Oral, 2 Times Daily PRN             Continue These  Medications        Instructions Start Date   albuterol sulfate  (90 Base) MCG/ACT inhaler  Commonly known as: PROVENTIL HFA;VENTOLIN HFA;PROAIR HFA   2 puffs, Inhalation, Every 4 Hours PRN      Blood Glucose Monitoring Suppl device   Use as directed four times daily ok to change to ins formulary      Insulin Pen Needle 32G X 4 MM misc   For use with insulin injections, daily      Lancets misc   Use as directed four times daily ok to change to ins formulary      omeprazole 20 MG capsule  Commonly known as: priLOSEC   20 mg, Oral, Daily      prenatal vitamin 28-0.8 28-0.8 MG tablet tablet   1 tablet, Oral, Daily             Stop These Medications      acetone (urine) test strip     glucose blood test strip     Lantus SoloStar 100 UNIT/ML injection pen  Generic drug: Insulin Glargine     metFORMIN  MG 24 hr tablet  Commonly known as: GLUCOPHAGE-XR              Discharge Disposition:  To Home    Discharge Condition:  Stable    Discharge Diet: regular    Activity at Discharge: Pelvic rest    Follow-up Appointments  Future Appointments   Date Time Provider Department Center   9/28/2023 10:45 AM Aniya Carrington MD [facility]     6 weeks with Krystal Barnett CNM  06/13/23  08:48 EDT

## 2023-06-13 NOTE — PROGRESS NOTES
[facility]  Vaginal Delivery Progress Note    Subjective     Doing well, pain controlled, lochia less than menses, voiding without difficulty      Objective     Vital Signs Range for the last 24 hours  Temperature: Temp:  [97.6 °F (36.4 °C)-98.1 °F (36.7 °C)] 97.6 °F (36.4 °C)   Temp Source: Temp src: Oral   BP: BP: (120-151)/(64-73) 120/73   Pulse: Heart Rate:  [71-82] 77   Respirations: Resp:  [16-18] 16   SPO2:     O2 Amount (l/min):     O2 Devices           Physical Exam:  General:  no acute distresss.  Abdomen: Soft, non-tender, fundus firm  Lochia: less than a normal period,  Perineum: is normal  Extremities: normal, atraumatic, no cyanosis, and trace edema.       Lab results reviewed:  Yes    Lab Results   Component Value Date    WBC 10.99 (H) 06/12/2023    HGB 12.2 06/12/2023    HCT 35.9 06/12/2023    MCV 84.7 06/12/2023     06/12/2023         Assessment & Plan       Normal spontaneous vaginal delivery      Marisol Black is Day 2  post-partum       Plan:  Discharge home with standard precautions and return to clinic in 6 weeks.      Ramila Barnett CNM  6/13/2023  08:44 EDT

## 2023-06-13 NOTE — LACTATION NOTE
06/13/23 1030   Maternal Information   Date of Referral 06/13/23   Person Making Referral patient;nurse   Maternal Reason for Referral other (see comments)  (Mom thinks baby is still hungry after breastfeeding.)   Maternal Assessment   Breast Size Issue none   Breast Shape Bilateral:;round   Breast Density Bilateral:;soft   Nipples Bilateral:;everted   Left Nipple Symptoms intact;nontender   Right Nipple Symptoms intact;nontender   Maternal Infant Feeding   Maternal Emotional State anxious  (Mom thinks baby is hungry after breastfeeding and he has been fussy at the breast.)   Infant Positioning cradle   Signs of Milk Transfer deep jaw excursions noted  (Baby does seem a little frantic at the breast.  He was given formula overnight.  Mom was shown how to syringe formula at the nipple while baby is latched so he will settle down and nurse.)   Pain with Feeding no   Latch Assistance minimal assistance   Milk Expression/Equipment   Breast Pump Type double electric, personal   Breast Pumping   Breast Pumping Interventions post-feed pumping encouraged  (Mom was encouraged to pump after breastfeeding attempts to encourage more milk volume.)

## 2023-06-13 NOTE — PAYOR COMM NOTE
"Serena Shine (32 y.o. Female)     Auth#[id number]     Patient to d/c today with baby.  ADMISSION DATE SHOULD BE 6/10/23.    From:Maribell Murray LPN, Utilization Review  Phone #[phone]  Fax #[phone]        Date of Birth   1990    Social Security Number       Address   [address] [place] [address]    Home Phone   [phone]    MRN   [id number]       Oriental orthodox   None    Marital Status   Single                            Admission Date   6/10/23    Admission Type   Elective    Admitting Provider   Mihaela Lacy MD    Attending Provider   Krystal Rivera CNM    Department, Room/Bed   [facility] 4A, N407/1       Discharge Date       Discharge Disposition   Home or Self Care    Discharge Destination                                 Attending Provider: Krystal Rivera CNM    Allergies: Demerol [Meperidine], Lexapro [Escitalopram Oxalate]    Isolation: None   Infection: None   Code Status: CPR    Ht: 177.8 cm (70\")   Wt: 129 kg (285 lb)    Admission Cmt: None   Principal Problem: None                  Active Insurance as of 6/10/2023       Primary Coverage       Payor Plan Insurance Group Employer/Plan Group    [employer] [place] [employer] [place]ICA[place]        Payor Plan Address Payor Plan Phone Number Payor Plan Fax Number Effective Dates    [address] [address] [phone]  4/16/2019 - None Entered    [facility] [id number]         Subscriber Name Subscriber Birth Date Member ID       SERENA SHINE 1990 [id number]                     Emergency Contacts        (Rel.) Home Phone Work Phone Mobile Phone    MAURICE NELSON (Friend) -- -- [phone]              Insurance Information                  [facility] MEDICAID Phone: [phone]    Subscriber: Serena Shine Subscriber#: [id number]    Group#: -- Precert#: --             History & Physical        Ramila Barnett CNM at 06/11/23 0745       Attestation signed by " Mihaela Lacy MD at 23 2131    I have reviewed this documentation and agree.                  [facility]  Obstetric History and Physical    Chief Complaint   Patient presents with    Rupture of Membranes       Subjective    Patient is a 32 y.o. female  currently at 39w2d, who presented last evening with SROM @ 1500. Fluid clear. Baby active. She refused pitocin and had discussed Cytotec with provider.  She has now had 3 doses cytotec without active labor.    Her prenatal care is complicated by  fetal anomalies  Spina Bifida .  Her previous obstetric/gynecological history is noted for previous term  x 3.    The following portions of the patients history were reviewed and updated as appropriate: current medications, allergies, past medical history, past surgical history, past family history, past social history, and problem list .       Prenatal Information:       External Prenatal Results       Pregnancy Outside Results - Transcribed From Office Records - See Scanned Records For Details       Test Value Date Time    ABO  O  06/10/23 1946    Rh  Negative  06/10/23 194    Antibody Screen  Negative  06/10/23 1946       Negative  23 1117       Positive  22 1412    Varicella IgG  773 index 22 1412    Rubella  1.02 index 22 1412    Hgb  12.5 g/dL 06/10/23 1900       12.0 g/dL 23 1223       12.6 g/dL 22 1412    Hct  37.0 % 06/10/23 1900       35.0 % 23 1223       36.8 % 22 1412    Glucose Fasting GTT       Glucose Tolerance Test 1 hour       Glucose Tolerance Test 3 hour       Gonorrhea (discrete)  Negative  10/31/16 1041    Chlamydia (discrete)  Negative  10/31/16 1041    RPR       VDRL       Syphilis Antibody       HBsAg  Non-Reactive  22 1412    Herpes Simplex Virus PCR       Herpes Simplex VIrus Culture       HIV  Non-Reactive  22 1412    Hep C RNA Quant PCR       Hep C Antibody  Non-Reactive  22 1412    AFP       Group B Strep        "GBS Susceptibility to Clindamycin       GBS Susceptibility to Erythromycin       Fetal Fibronectin       Genetic Testing, Maternal Blood                 Drug Screening       Test Value Date Time    Urine Drug Screen       Amphetamine Screen  Negative  22 1412    Barbiturate Screen  Negative  22 1412    Benzodiazepine Screen  Negative  22 1412    Methadone Screen  Negative  22 1412    Phencyclidine Screen  Negative  22 1412    Opiates Screen  Negative  22 1412    THC Screen  Positive  22 1412    Cocaine Screen       Propoxyphene Screen  Negative  22 1412    Buprenorphine Screen  Negative  22 1412    Methamphetamine Screen       Oxycodone Screen  Negative  22 1412    Tricyclic Antidepressants Screen  Negative  22 1412              Legend    ^: Historical                              Past OB History:       OB History    Para Term  AB Living   5 3 3 0 1 3   SAB IAB Ectopic Molar Multiple Live Births   0 0 0 0 0 3      # Outcome Date GA Lbr Gonsalo/2nd Weight Sex Delivery Anes PTL Lv   5 Current            4 Term 12 40w0d  3062 g (6 lb 12 oz) F Vag-Spont EPI N GERMAINE   3 AB 11 10w0d    SAB         Birth Comments: Had large amount of bleeding at home, got a blood transfusion - had D & c   2 Term 09 37w0d  3459 g (7 lb 10 oz) M Vag-Spont EPI Y GERMAINE      Birth Comments: during labor \" lost babies heart beat, did not have heart rate for 6 minutes post birth- went to \"   pulmonary stenosis      Complications: Fetal Intolerance   1 Term 03/15/07 38w0d  3459 g (7 lb 10 oz) F Vag-Spont Combined spi N GERMAINE      Obstetric Comments   Fob # 1 - Pregnancy #1 NIPT - normal    Fob #2 - Pregnancy #2, #3, and #4  - all testing normal    Fob #3 - Pregnancy #5 - NIPT low risk - male        Past Medical History: Past Medical History:   Diagnosis Date    Asthma     as a kid, pt reports \"I don't remeber ever not having an inhaler\"    Asthma due to " "seasonal allergies     Depression 2007    Disease of thyroid gland     pt states \"I am concerned about it, but no official diagnosis or meds\"    Gestational diabetes     History of bipolar disorder 2007    History of blood transfusion 2011    post miscarriage    Miscarriage     2011- pt reports getting blood transfusion      Past Surgical History Past Surgical History:   Procedure Laterality Date    DILATION AND CURETTAGE, DIAGNOSTIC / THERAPEUTIC      2011- bleeding- blood transfusion, early miscarriage    WISDOM TOOTH EXTRACTION        Family History: Family History   Problem Relation Age of Onset    Hypertension Mother     Obesity Sister     Asthma Daughter     Heart defect Son       Social History:  reports that she quit smoking about 3 years ago. Her smoking use included cigarettes. She has never used smokeless tobacco.   reports that she does not currently use alcohol.   reports that she does not currently use drugs after having used the following drugs: Marijuana.        General ROS: Pertinent items are noted in HPI, all other systems reviewed and negative    Objective    Vitals:    06/11/23 0713   BP: 134/89   Pulse: 85   Resp: 16   Temp: 97.7 °F (36.5 °C)     Weight: Weight:  [129 kg (285 lb)] 129 kg (285 lb)     Physical Examination:   General Appearance: alert, well appearing, in no apparent distress  Lungs: clear to auscultation, no wheezes, rales or rhonchi, symmetric air entry  Heart: regular rate and rhythm, no murmurs  Abdomen: FHT present  Back exam: no CVA tenderness   Extremities: no redness or tenderness in the calves or thighs, no edema  Skin: normal coloration and turgor, no rashes      Presentation: [id number]   Cervix: Exam by: Method: sterile exam per RN   Dilation: Cervical Dilation (cm): 3-4   Effacement: Cervical Effacement: 60-70%   Station: Fetal Station: -2        Fetal Heart Rate Assessment   Method: Fetal HR Assessment Method: external   Beats/min: Fetal HR (beats/min): 130   Baseline: " Fetal HR Baseline: normal range   Varibility: Fetal HR Variability: moderate (amplitude range 6 to 25 bpm)   Accels: Fetal HR Accelerations: greater than/equal to 15 bpm, lasting at least 15 seconds   Decels: Fetal HR Decelerations: absent   Tracing Category:  1     Uterine Assessment   Method: Method: Telemetry/Wireless Uterine Activity Monitor   Frequency (min): Contraction Frequency (Minutes): 2-5.5   Ctx Count in 10 min: Contractions in 10 Minutes: poor tracing   Duration:     Intensity: Contraction Intensity: mild by palpation   Intensity by IUPC:     Resting Tone: Uterine Resting Tone: soft by palpation   Resting Tone by IUPC:     [place] Units:       Laboratory Results: Labs reviewed        PROM (premature rupture of membranes)        Assessment:  1.  Intrauterine pregnancy at 39w2d weeks gestation with reactive, reassuring fetal status.    2.  Prodromal labor  with ROM  3.  Obstetrical history is remarkable for  Infant with closed Spina Bifida.  4.  GBS status: Positive    Plan:  1. fetal and uterine monitoring  continuously, labor augmentation  Misoprostol, analgesia with  parenteral narcotics, and antibiotic for GBS  2. Plan of care has been reviewed with patient   3.  4th dose of Cytotec placed, 50 mcg this dose. Discussed need for change in plan if not actively laboring in 4 hours.  4.  All questions have been answered.        Ramila Barnett CNM  6/11/2023  07:45 EDT      Electronically signed by Mihaela Lacy MD at 06/11/23 2131       Orders (last 7 days)        Start     Ordered    06/13/23 0846  Discharge patient  Once         06/13/23 0847    06/13/23 0000  docusate sodium (COLACE) 100 MG capsule  2 Times Daily PRN         06/13/23 0847    06/13/23 0000  acetaminophen (TYLENOL) 325 MG tablet  Every 6 Hours PRN         06/13/23 0847    06/13/23 0000  benzocaine-menthol (DERMOPLAST) 20-0.5 % aerosol topical spray  As Needed         06/13/23 0847    06/13/23 0000  Hydrocortisone, Perianal,  (ANUSOL-HC) 2.5 % rectal cream  As Needed         06/13/23 0847    06/13/23 0000  ibuprofen (ADVIL,MOTRIN) 600 MG tablet  Every 6 Hours PRN         06/13/23 0847    06/13/23 0000  lanolin topical  Every 1 Hour PRN         06/13/23 0847    06/13/23 0000  witch hazel-glycerin (TUCKS) pad  As Needed         06/13/23 0847    06/13/23 0000  Activity as Tolerated         06/13/23 0847    06/13/23 0000  Pelvic Rest        Comments: No intercourse, tampons or vaginal medication for 6 weeks    06/13/23 0847    06/13/23 0000  Discharge Follow-up with Specified Provider:        Comments: Krystal Rivera CNSAYRA    06/13/23 0847    06/13/23 0000  Call MD for problems / concerns.        Comments: Heavy bleeding, fever >/+100.4, increased pain not relieved with analgesic      06/13/23 0847    06/12/23 0900  prenatal vitamin tablet 1 tablet  Daily         06/11/23 2122    06/12/23 0800  Sitz Bath  3 Times Daily        Comments: PRN    06/11/23 2122    06/12/23 0719  POC Glucose Once  PROCEDURE ONCE         06/12/23 0713    06/12/23 0610  POC Glucose Once  PROCEDURE ONCE         06/12/23 0608    06/12/23 0600  CBC & Differential  Timed        Comments: Postpartum Day 1      06/11/23 2122 06/12/23 0600  CBC Auto Differential  PROCEDURE ONCE        Comments: Postpartum Day 1      06/11/23 2205    06/12/23 0415  Rho D Immune Globulin (RHOPHYLAC) injection 1,500 Units  Once        Note to Pharmacy: RhoGAM, MicRhoGAM, and HyperRHO S/D are IM only formulations. If IV is selected on , only Rhophylac and WinRHO products will be allowed for dispense. If NDC is missing on verification, please confirm order route and product stocked are compatible.    06/12/23 0315    06/12/23 0253  Doses of Rh Immune Globulin  Once         06/12/23 0252    06/12/23 0134  Postpartum RhIg Evaluation  Once         06/12/23 0134    06/12/23 0134  Fetal Bleed Screen  Once         06/12/23 0134    06/12/23 0000  POC Glucose Finger Q4H  Every 4 Hours,    Status:  Canceled      Comments: Check blood sugar 4 times per day before meals and fasting      06/11/23 2155 06/12/23 0000  POC Glucose Finger Q4H  Every 4 Hours,   Status:  Canceled      Comments: Check blood sugar 4 times per day before meals and fastingCall provider is BS is >150      06/11/23 2200 06/11/23 2215  docusate sodium (COLACE) capsule 100 mg  2 Times Daily         06/11/23 2122 06/11/23 2123  Code Status and Medical Interventions:  Continuous         06/11/23 2122 06/11/23 2123  Vital Signs Per hospital policy  Per Hospital Policy         06/11/23 2122 06/11/23 2123  Notify Physician  Until Discontinued         06/11/23 2122 06/11/23 2123  Up Ad Lois  Until Discontinued         06/11/23 2122 06/11/23 2123  Ambulate Patient  Every Shift       06/11/23 2122 06/11/23 2123  Patient May Shower  Once         06/11/23 2122 06/11/23 2123  Fundal and Lochia Check  Per Hospital Policy        Comments: Q 15 min x 4, Q 30 min x 2, then Q Shift    06/11/23 2122 06/11/23 2123  RN to Assess Rh Status & Place RhIG Evaluation Order if Indicated  Continuous         06/11/23 2122 06/11/23 2123  Bladder Assessment  Per Order Details        Comments: Postpartum 1) Upon Admission to Unit & Every 4 Hours PRN Until Voiding. 2) Out of Bed to Void in 8 Hours.    06/11/23 2122 06/11/23 2123  Straight Cath  Per Order Details        Comments: Postpartum: If Distended & Unable to Void, May Repeat Once.    06/11/23 2122 06/11/23 2123  Indwelling Urinary Catheter  Per Order Details        Comments: Postpartum : After Straight Cathed x2 or if Greater Than 1000mL Residual, Insert Indwelling Urinary Catheter Until Further MD Order.    06/11/23 2122 06/11/23 2123  Breast pump to bed  Once         06/11/23 2122 06/11/23 2123  If indicated -- Please administer RH Immunoglobulin based on results of cord blood evaluation and fetal screen lab tests, pharmacy to dispense  Continuous         Comments: See process instructions for reference range details.    06/11/23 2122 06/11/23 2122  lanolin topical 1 application  Every 1 Hour PRN         06/11/23 2122 06/11/23 2122  benzocaine-menthol (DERMOPLAST) 20-0.5 % topical spray  As Needed         06/11/23 2122 06/11/23 2122  witch hazel-glycerin (TUCKS) pad 1 pad  As Needed         06/11/23 2122 06/11/23 2122  Hydrocortisone (Perianal) (ANUSOL-HC) 2.5 % rectal cream 1 application  As Needed         06/11/23 2122 06/11/23 2122  Measles, Mumps & Rubella Vac (MMR) injection 0.5 mL  During Hospitalization         06/11/23 2122 06/11/23 2122  ibuprofen (ADVIL,MOTRIN) tablet 600 mg  Every 6 Hours PRN         06/11/23 2122 06/11/23 2122  acetaminophen (TYLENOL) tablet 650 mg  Every 6 Hours PRN         06/11/23 2122 06/11/23 1945  oxytocin (PITOCIN) 30 units in 0.9% sodium chloride 500 mL (premix)  Continuous        See Hyperspace for full Linked Orders Report.    06/11/23 1833 06/11/23 1930  oxytocin (PITOCIN) 30 units in 0.9% sodium chloride 500 mL (premix)  Once,   Status:  Discontinued        See Hyperspace for full Linked Orders Report.    06/11/23 1833 06/11/23 1835  DIET MESSAGE Please send chicked tenders and tater tots, fresh fruit, cookie  Once        Comments: Please send chicked tenders and tater tots, fresh fruit, cookie    06/11/23 1835 06/11/23 1834  Notify Provider (Specified)  Until Discontinued         06/11/23 1833 06/11/23 1834  Vital Signs Per Hospital Policy  Per Hospital Policy         06/11/23 1833 06/11/23 1834  Fundal & Lochia Check  Per Order Details        Comments: Every 15 Minutes x4, Then Every 30 Minutes x2, Then Every Shift    06/11/23 1833 06/11/23 1834  Diet: Regular/House Diet; Texture: Regular Texture (IDDSI 7); Fluid Consistency: Thin (IDDSI 0)  Diet Effective Now         06/11/23 1833 06/11/23 1834  Nurse may remove epidural catheter after delivery.  Until Discontinued          06/11/23 1833 06/11/23 1834  Transfer to postpartum when discharge criteria met.  Until Discontinued         06/11/23 1833 06/11/23 1833  Fundal & Lochia Check  Every Shift       06/11/23 1833 06/11/23 1833  methylergonovine (METHERGINE) injection 200 mcg  Once As Needed,   Status:  Discontinued         06/11/23 1833    06/11/23 1833  carboprost (HEMABATE) injection 250 mcg  Every 15 Minutes PRN,   Status:  Discontinued         06/11/23 1833 06/11/23 1833  miSOPROStol (CYTOTEC) tablet 800 mcg  Once As Needed,   Status:  Discontinued         06/11/23 1833 06/11/23 1817  VTE Prophylaxis Not Indicated: Reduced Mobility (3), Obesity-BMI >30 (1); </= 3 (Low Risk)  Once         06/11/23 1818    06/11/23 1816  Transfer Patient  Once         06/11/23 1818 06/11/23 1606  POC Glucose Once  PROCEDURE ONCE         06/11/23 1605    06/11/23 1600  ropivacaine (NAROPIN) 0.2 % injection  Continuous,   Status:  Discontinued         06/11/23 1501    06/11/23 1600  Sod Citrate-Citric Acid (BICITRA) solution 30 mL  Once,   Status:  Discontinued         06/11/23 1501    06/11/23 1502  Vital Signs Per Anesthesia Guidelines  Per Order Details,   Status:  Canceled        Comments: Every 3 Minutes x20 Minutes Following Epidural Dosing, Then Every 15 Minutes If Stable    06/11/23 1501    06/11/23 1502  Start IV with #16 or #18 gauge angiocath.  Once,   Status:  Canceled         06/11/23 1501    06/11/23 1502  Nurse or anesthesiologist to remain with patient for 15 minutes following dosing.  Until Discontinued,   Status:  Canceled         06/11/23 1501    06/11/23 1502  Facilitate maternal postion on side and maintain uterine displacement.  Until Discontinued,   Status:  Canceled         06/11/23 1501    06/11/23 1502  Consult anesthesia services prior to changing epidural infusion/rate.  Until Discontinued,   Status:  Canceled         06/11/23 1501    06/11/23 1501  lactated ringers bolus 1,000 mL  As Needed,   Status:   Discontinued         06/11/23 1501    06/11/23 1501  ePHEDrine Sulfate (Pressors) 5 MG/ML injection 10 mg  Every 10 Minutes PRN,   Status:  Discontinued         06/11/23 1501    06/11/23 1501  metoclopramide (REGLAN) injection 10 mg  Once As Needed,   Status:  Discontinued         06/11/23 1501    06/11/23 1501  ondansetron (ZOFRAN) injection 4 mg  Once As Needed,   Status:  Discontinued         06/11/23 1501    06/11/23 1501  famotidine (PEPCID) injection 20 mg  Once As Needed,   Status:  Discontinued         06/11/23 1501    06/11/23 1501  diphenhydrAMINE (BENADRYL) injection 12.5 mg  Every 8 Hours PRN,   Status:  Discontinued         06/11/23 1501    06/11/23 1446  Code Status and Medical Interventions:  Continuous,   Status:  Canceled         06/11/23 1445    06/11/23 1440  morphine injection 4 mg  Every 4 Hours PRN,   Status:  Discontinued         06/11/23 1440    06/11/23 1245  oxytocin (PITOCIN) 30 units in 0.9% sodium chloride 500 mL (premix)  Titrated,   Status:  Discontinued         06/11/23 1152    06/11/23 1210  POC Glucose Once  PROCEDURE ONCE         06/11/23 1205    06/11/23 0931  Cannabinoid Confirmation, Ur - Urine, Clean Catch  Once,   Status:  Canceled         06/11/23 0930    06/11/23 0919  Fentanyl, Urine - Urine, Clean Catch  Once         06/11/23 0919    06/11/23 0909  Urine Drug Screen - Urine, Clean Catch  Once         06/11/23 0909    06/11/23 0810  POC Glucose Once  PROCEDURE ONCE         06/11/23 0759    06/11/23 0805  miSOPROStol (CYTOTEC) split tablet 50 mcg  Once,   Status:  Discontinued         06/11/23 0812    06/11/23 0401  POC Glucose Once  PROCEDURE ONCE         06/11/23 0359    06/11/23 0053  POC Glucose Once  PROCEDURE ONCE         06/11/23 0051    06/11/23 0037  zolpidem (AMBIEN) tablet 5 mg  Nightly PRN         06/11/23 0038    06/11/23 0000  Group B Streptococcus Culture - Swab, Vaginal/Rectum        Comments: This is an external result entered through the Results Console.       06/11/23 0855    06/11/23 0000  Group B Streptococcus Culture - Swab, Vaginal/Rectum        Comments: This is an external result entered through the Results Console.      06/11/23 0908    06/10/23 2318  penicillin G in iso-osmotic dextrose IVPB 3 million units (premix)  Every 4 Hours,   Status:  Discontinued        See Hyperspace for full Linked Orders Report.    06/10/23 1834    06/10/23 2145  miSOPROStol (CYTOTEC) split tablet 25 mcg  Every 4 Hours Scheduled,   Status:  Discontinued         06/10/23 2053    06/10/23 2100  sodium chloride 0.9 % flush 10 mL  Every 12 Hours Scheduled,   Status:  Discontinued         06/10/23 1834    06/10/23 2100  insulin glargine (LANTUS) injection 18 Units - Patient Supplied  Nightly,   Status:  Discontinued         06/11/23 0048    06/10/23 2022  POC Glucose Once  PROCEDURE ONCE         06/10/23 2019    06/10/23 2000  POC Glucose Finger Q4H  Every 4 Hours,   Status:  Canceled      Comments: Q 2 in active labor      06/10/23 1913    06/10/23 1930  lactated ringers infusion  Continuous,   Status:  Discontinued         06/10/23 1834    06/10/23 1930  mineral oil liquid 30 mL  Once,   Status:  Discontinued         06/10/23 1834    06/10/23 1930  penicillin g 5 mu/100 mL 0.9% NS IVPB (mbp)  Once        See Hyperspace for full Linked Orders Report.    06/10/23 1834    06/10/23 1930  POC Amnisure  Once         06/10/23 1929    06/10/23 1912  Type & Screen  Once         06/10/23 1834    06/10/23 1834  Preeclampsia Panel  Once         06/10/23 1834    06/10/23 1833  fentaNYL citrate (PF) (SUBLIMAZE) injection 50 mcg  Every 1 Hour PRN,   Status:  Discontinued         06/10/23 1834    06/10/23 1833  ondansetron (ZOFRAN) tablet 4 mg  Every 6 Hours PRN,   Status:  Discontinued        See Hyperspace for full Linked Orders Report.    06/10/23 1834    06/10/23 1833  ondansetron (ZOFRAN) injection 4 mg  Every 6 Hours PRN,   Status:  Discontinued        See Hyperspace for full Linked Orders  Report.    06/10/23 1834    06/10/23 1833  Admit To Obstetrics Inpatient  Once         06/10/23 1834    06/10/23 1833  Obtain Informed Consent  Once,   Status:  Canceled         06/10/23 1834    06/10/23 1833  Vital Signs Per Hospital Policy  Per Hospital Policy,   Status:  Canceled         06/10/23 1834    06/10/23 1833  Mini-Prep Prior to Delivery  Once,   Status:  Canceled         06/10/23 1834    06/10/23 1833  Continuous Fetal Monitoring With NST on Admission and Prior to Initiation of Oxytocin.  Per Order Details,   Status:  Canceled        Comments: Continuous Fetal Monitoring With NST on Admission & Prior to Initiation of Oxytocin.    06/10/23 1834    06/10/23 1833  External Uterine Contraction Monitoring  Per Hospital Policy,   Status:  Canceled         06/10/23 1834    06/10/23 1833  Notify Provider (Specified)  Until Discontinued,   Status:  Canceled         06/10/23 1834    06/10/23 1833  Notify Provider of Tachysystole (Per Hospital Algorithm)  Until Discontinued,   Status:  Canceled         06/10/23 1834    06/10/23 1833  Notify Provider if Membranes Ruptured, Bleeding Greater Than 1 Pad Per Hour, Fetal Heart Tone Abnormality or Severe Pain  Until Discontinued,   Status:  Canceled         06/10/23 1834    06/10/23 1833  Initiate Group Beta Strep (GBS) Prophylaxis Protocol, If Criteria Met  Continuous,   Status:  Canceled        Comments: NO TREATMENT RECOMMENDED IF: 1) Maternal GBS Status Known Negative 2) Scheduled  Birth With Intact Membranes, Not in Labor 3) Maternal GBS Status Unknown, No Risk Factors  TREAT WITH ANTIBIOTICS IF:  1) Maternal GBS Status Known Positive 2) Maternal GBS Status Unknown With Risk Factors: a)  Previous Infant Affected By GBS Infection b) GBS Urinary Tract Infection (UTI) or Bacteriuria During Pregnancy c) Unexplained Maternal Fever (100.4F (38C) or Greater) During Labor d)  Prolonged Rupture of Membranes (18 or More Hours) e) Gestational Age Less Than 37 Weeks     06/10/23 1834    06/10/23 1833  NPO Diet NPO Type: Ice Chips  Diet Effective Now,   Status:  Canceled         06/10/23 1834    06/10/23 1833  CBC (No Diff)  Once         06/10/23 1834    06/10/23 1833  Insert Peripheral IV  Once,   Status:  Canceled         06/10/23 1834    06/10/23 1833  Saline Lock & Maintain IV Access  Continuous,   Status:  Canceled         06/10/23 1834    06/10/23 1833  VTE Prophylaxis Not Indicated: Reduced Mobility (3); </= 3 (Low Risk)  Once         06/10/23 1834    06/10/23 183  Position Change - For Intra-Uterine Resusitation for Hypertonus, HyperStimulation or Non-Reassuring Fetal Status  As Needed,   Status:  Canceled       06/10/23 1834    06/10/23 183  lidocaine PF 1% (XYLOCAINE) injection 5 mL  As Needed,   Status:  Discontinued         06/10/23 1834    06/10/23 183  sodium chloride 0.9 % flush 10 mL  As Needed,   Status:  Discontinued         06/10/23 1834    06/10/23 183  lactated ringers bolus 1,000 mL  Once As Needed         06/10/23 1834    Unscheduled  Apply Ice to Perineum  As Needed      Comments: For 20 min q 2 hrs    23    Unscheduled  Waffle Cushion  As Needed      Comments: For perineal discomfort    23    Unscheduled  Warm compress  As Needed       23                     Operative/Procedure Notes (last 7 days)        Ramila Barnett CNM at 23 1821           [facility]   Vaginal Delivery Note    Patient Name: Marisol Black  : 1990  MRN: [id number]    Date of Delivery: 2023     Diagnosis     Pre & Post-Delivery:  Intrauterine pregnancy at 39w2d  Labor status: Spontaneous Onset of Labor     Normal spontaneous vaginal delivery             Problem List    Transfer to Postpartum     Review the Delivery Report for details.     Delivery     Delivery: Vaginal, Spontaneous     YOB: 2025    Time of Birth:  Gestational Age 6:01 PM   39w2d     Anesthesia: Epidural     Delivering clinician: Ramila  Brianna Barnett CNM   Forceps?   No   Vacuum? No    Shoulder dystocia present: No        Delivery narrative:  Marisol pushed well to a spontaneous vaginal delivery of an viable male infant in OA position over intact perineum, with labor epidural.  Mouth and nose bulb suctioned after delivery of head. Shoulders and body delivered easily atraumatically. Vigorous infant placed on mothers abdomen and dried.  Cord was allowed to cease pulsation, double clamped, cut by patient. Cord blood and cord segment obtained.  Spontaneous delivery of intact placenta with 3 vessel cord. Fundus firm, lochia light rubra.  Perineal and vaginal inspection revealed no laceration.  Mother and son stable in the immediate PP period.       Infant     Findings: male  infant     Infant observations: Weight: 3460 g (7 lb 10.1 oz)   Length: 19  in  Observations/Comments:        Apgars: 8  @ 1 minute /    9  @ 5 minutes   Infant Name: Camila     Placenta & Cord         Placenta delivered  Spontaneous  at        Cord: 3 vessels  present.   Nuchal Cord?  yes; Number of nuchal loops present:  1    Cord blood obtained: Yes    Cord gases obtained:  No      Repair     Episiotomy: None     No    Lacerations: No   Estimated Blood Loss: Est. Blood Loss (mL): 50 mL (Filed from Delivery Summary) (06/11/23 1801)     Quantitative Blood Loss:          Complications     none    Disposition     Mother to Mother Baby/Postpartum  in stable condition currently.  Baby to remains with mom  in stable condition currently.    Ramila Barnett CNM  06/11/23  18:21 EDT          Electronically signed by Ramila Barnett CNM at 06/11/23 2016          Discharge Summary        Ramila Barnett CNM at 06/13/23 0848          [facility]  Vaginal Delivery Discharge Summary      Patient: Marisol Black      MR#:[id number]  Admission  Diagnosis: 39 weeks 2 days gestation, Premature ROM without labor,  Discharge Diagnosis: Spontaneous vaginal delivery    Date of  Admission: 6/10/2023  Date of Discharge:  6/13/2023    Procedures:  Vaginal, Spontaneous     6/11/2023    6:01 PM      Service:  Obstetrics    Hospital Course:  Patient underwent vaginal delivery and remained in the hospital for 3 days.  During that time she remained afebrile and hemodynamically stable.  On the day of discharge, she was eating, ambulating and voiding without difficulty.      Lab Results   Component Value Date    WBC 10.99 (H) 06/12/2023    HGB 12.2 06/12/2023    HCT 35.9 06/12/2023    MCV 84.7 06/12/2023     06/12/2023    URICACID 3.7 06/10/2023    AST 23 06/10/2023    ALT 9 06/10/2023     (H) 06/10/2023     Results from last 7 days   Lab Units 06/12/23  0203 06/10/23  1946   ABO TYPING  O O   RH TYPING  Negative Negative   ANTIBODY SCREEN   --  Negative       Discharge Medications     Discharge Medications        New Medications        Instructions Start Date   acetaminophen 325 MG tablet  Commonly known as: TYLENOL   650 mg, Oral, Every 6 Hours PRN      benzocaine-menthol 20-0.5 % aerosol topical spray  Commonly known as: DERMOPLAST   Topical, As Needed      Hydrocortisone (Perianal) 2.5 % rectal cream  Commonly known as: ANUSOL-HC   1 application, Rectal, As Needed      ibuprofen 600 MG tablet  Commonly known as: ADVIL,MOTRIN   600 mg, Oral, Every 6 Hours PRN      lanolin topical   1 application, Topical, Every 1 Hour PRN      witch hazel-glycerin pad  Commonly known as: TUCKS   1 pad, Topical, As Needed             Changes to Medications        Instructions Start Date   docusate sodium 100 MG capsule  Commonly known as: COLACE  What changed:   when to take this  reasons to take this   100 mg, Oral, 2 Times Daily PRN             Continue These Medications        Instructions Start Date   albuterol sulfate  (90 Base) MCG/ACT inhaler  Commonly known as: PROVENTIL HFA;VENTOLIN HFA;PROAIR HFA   2 puffs, Inhalation, Every 4 Hours PRN      Blood Glucose Monitoring Suppl device    Use as directed four times daily ok to change to ins formulary      Insulin Pen Needle 32G X 4 MM misc   For use with insulin injections, daily      Lancets misc   Use as directed four times daily ok to change to ins formulary      omeprazole 20 MG capsule  Commonly known as: priLOSEC   20 mg, Oral, Daily      prenatal vitamin 28-0.8 28-0.8 MG tablet tablet   1 tablet, Oral, Daily             Stop These Medications      acetone (urine) test strip     glucose blood test strip     Lantus SoloStar 100 UNIT/ML injection pen  Generic drug: Insulin Glargine     metFORMIN  MG 24 hr tablet  Commonly known as: GLUCOPHAGE-XR              Discharge Disposition:  To Home    Discharge Condition:  Stable    Discharge Diet: regular    Activity at Discharge: Pelvic rest    Follow-up Appointments  Future Appointments   Date Time Provider Department Center   9/28/2023 10:45 AM Aniya Carrington MD [facility]     6 weeks with Krystal Barnett CNM  06/13/23  08:48 EDT    Electronically signed by Ramila Barnett CNM at 06/13/23 0850

## 2023-07-26 ENCOUNTER — LAB REQUISITION (OUTPATIENT)
Dept: LAB | Facility: HOSPITAL | Age: 33
End: 2023-07-26
Payer: COMMERCIAL

## 2023-07-26 DIAGNOSIS — Z09 POSTOP CHECK: Primary | ICD-10-CM

## 2023-07-26 DIAGNOSIS — Z30.2 ENCOUNTER FOR STERILIZATION: ICD-10-CM

## 2023-07-26 PROCEDURE — 88302 TISSUE EXAM BY PATHOLOGIST: CPT | Performed by: OBSTETRICS & GYNECOLOGY

## 2023-07-26 RX ORDER — HYDROCODONE BITARTRATE AND ACETAMINOPHEN 5; 325 MG/1; MG/1
1 TABLET ORAL EVERY 6 HOURS PRN
Qty: 20 TABLET | Refills: 0 | Status: SHIPPED | OUTPATIENT
Start: 2023-07-26

## 2023-07-26 RX ORDER — DOCUSATE SODIUM 100 MG/1
100 CAPSULE, LIQUID FILLED ORAL 2 TIMES DAILY PRN
Qty: 30 CAPSULE | Refills: 2 | Status: SHIPPED | OUTPATIENT
Start: 2023-07-26

## 2023-07-26 RX ORDER — IBUPROFEN 600 MG/1
600 TABLET ORAL EVERY 6 HOURS PRN
Qty: 30 TABLET | Refills: 2 | Status: SHIPPED | OUTPATIENT
Start: 2023-07-26

## 2023-07-26 RX ORDER — SIMETHICONE 80 MG
80 TABLET,CHEWABLE ORAL EVERY 6 HOURS PRN
Qty: 20 TABLET | Refills: 1 | Status: SHIPPED | OUTPATIENT
Start: 2023-07-26

## 2023-07-27 LAB
CYTO UR: NORMAL
LAB AP CASE REPORT: NORMAL
LAB AP CLINICAL INFORMATION: NORMAL
PATH REPORT.FINAL DX SPEC: NORMAL
PATH REPORT.GROSS SPEC: NORMAL

## 2023-09-28 ENCOUNTER — OFFICE VISIT (OUTPATIENT)
Dept: ENDOCRINOLOGY | Facility: CLINIC | Age: 33
End: 2023-09-28
Payer: COMMERCIAL

## 2023-09-28 VITALS
HEART RATE: 80 BPM | OXYGEN SATURATION: 98 % | SYSTOLIC BLOOD PRESSURE: 124 MMHG | DIASTOLIC BLOOD PRESSURE: 74 MMHG | HEIGHT: 70 IN | BODY MASS INDEX: 39.8 KG/M2 | WEIGHT: 278 LBS

## 2023-09-28 DIAGNOSIS — R63.8 UNABLE TO LOSE WEIGHT: ICD-10-CM

## 2023-09-28 LAB
EXPIRATION DATE: NORMAL
EXPIRATION DATE: NORMAL
GLUCOSE BLDC GLUCOMTR-MCNC: 108 MG/DL (ref 70–130)
HBA1C MFR BLD: 5.1 %
Lab: NORMAL
Lab: NORMAL

## 2023-09-28 RX ORDER — DEXAMETHASONE 1 MG
1 TABLET ORAL ONCE
Qty: 1 TABLET | Refills: 0 | Status: SHIPPED | OUTPATIENT
Start: 2023-09-28 | End: 2023-09-28

## 2023-09-28 NOTE — PROGRESS NOTES
"Chief Complaint   Patient presents with   • Gestational Diabetes        HPI   Marisol Black is a 32 y.o. female had concerns including Gestational Diabetes.     Patient is now postpartum. She is breastfeeding. Both she and baby are doing well. Insulin was stopped after delivery. She is not monitoring glucose. Patient reports ongoing frustration about inability to lose weight.    The following portions of the patient's history were reviewed and updated as appropriate: allergies, current medications, and past social history.    Review of Systems   Constitutional:  Positive for unexpected weight gain.   Endocrine: Negative for polydipsia and polyuria.        /74 (BP Location: Left arm, Patient Position: Sitting, Cuff Size: Adult)   Pulse 80   Ht 177.8 cm (70\")   Wt 126 kg (278 lb)   SpO2 98%   BMI 39.89 kg/m²      Physical Exam      Constitutional:  well developed; well nourished  no acute distress  obese - Body mass index is 39.89 kg/m².   ENT/Thyroid: not examined   Eyes: Conjunctiva: clear   Respiratory:  breathing is unlabored  clear to auscultation bilaterally   Cardiovascular:  regular rate and rhythm   Chest:  Not performed.   Abdomen: Not performed.   : Not performed.   Musculoskeletal: Not performed   Skin: not performed.   Neuro: mental status, speech normal   Psych: mood and affect are within normal limits       Labs/Imaging   Latest Reference Range & Units 09/28/23 10:44 09/28/23 10:49   Glucose 70 - 130 mg/dL 108    Hemoglobin A1C %  5.1       Diagnoses and all orders for this visit:    1. Gestational diabetes mellitus (GDM), postpartum (Primary)  -     POC Glucose, Blood  -     POC Glycosylated Hemoglobin (Hb A1C)  Required insulin for management, discontinued following delivery.   A1C normal during office visit. Discussed increased long term risk for development of type 2 diabetes in the future.  Reviewed recommendation for screening for diabetes annually, sooner if concerning symptoms " develop.   Discussed importance of dietary changes, exercise and the maintenance of a healthy weight in preventing development of diabetes in the future.    2. Unable to lose weight  -     TSH; Future  -     T4, Free; Future  -     Dexamethasone Level, Serum; Future  -     Cortisol - AM; Future  Plan to repeat thyroid function testing and screen for hypercortisolism. Patient is aware that dexamethasone can cross into breastmilk and may have fetal effects. She reports that she will use frozen breastmilk until this has cleared her system. She was encouraged to check with OB regarding timing. We also reviewed the possibility of doing 24 hour urine collection.  Discussed if testing is normal, could consider use of weight loss medication once breastfeeding complete.    Other orders  -     dexAMETHasone (DECADRON) 1 MG tablet; Take 1 tablet by mouth 1 (One) Time for 1 dose. Take at 11 PM night before lab draw at 8 AM.  Dispense: 1 tablet; Refill: 0         Return in about 9 months (around 6/28/2024) for Next scheduled follow up. The patient was instructed to contact the clinic with any interval questions or concerns.    Aniya Carrington MD   Endocrinologist    Dictated Utilizing Dragon Dictation

## 2023-12-29 ENCOUNTER — HOSPITAL ENCOUNTER (OUTPATIENT)
Dept: LACTATION | Facility: HOSPITAL | Age: 33
Discharge: HOME | End: 2023-12-29
Payer: COMMERCIAL

## 2023-12-29 NOTE — LACTATION NOTE
12/29/23 1415   Maternal Information   Date of Referral 12/29/23   Person Making Referral lactation consultant   Maternal Reason for Referral breastfeeding currently   Infant Reason for Referral weight gain inadequate  (Infant dropped on growth chart.  11% at 4 months, 3% at 6 months.)   Maternal Assessment   Breast Size Issue none   Breast Shape Bilateral:;round   Breast Density Bilateral:;soft   Nipples Bilateral:;everted   Left Nipple Symptoms intact;nontender   Right Nipple Symptoms intact;nontender   Maternal Infant Feeding   Maternal Emotional State receptive;relaxed   Infant Positioning cradle;cross-cradle   Signs of Milk Transfer audible swallow;deep jaw excursions noted  (distracted at breast.  appropriate for 6 months age)   Pain with Feeding no   Comfort Measures Before/During Feeding   (infant resistant to changes with nursing.  lath does appear to be shallow but mom reports non-painful.)   Nipple Shape After Feeding, Left Breast round;symmetrical;appropriately projected   Nipple Shape After Feeding, Right round;symmetrical;appropriately projected   Latch Assistance none needed   Feeding Infant   Feeding Readiness Cues eager;hand to mouth movements;sucking motion present;sustained alertness   Satiety Cues cessation of sucking;infant releases breast   Feeding Tolerance/Success alert for feeding;averts head;coordinated suck/swallow/breathing;suck inconsistent   Feeding Physical Stress Cues   (small spit after nursing)   Effective Latch During Feeding yes   Suck/Swallow/Breathing Coordination present   Prefeeding Weight (gm) 6800 g (239.9 oz)   Postfeeding Weight (gm) 6845 g (241.5 oz)   Weight Gain/Loss (gm)  45 g (1.6 oz)   Breastfeeding Session   Breastfeeding breastfeeding, left side only  (didn't want to nurse on left, content after right breast)   Breastfeeding Time, Left (min) 8   LATCH Score   Latch 2-->grasps breast, tongue down, lips flanged, rhythmic sucking   Audible Swallowing  2-->spontaneous and intermittent (24 hrs old)   Type of Nipple 2-->everted (after stimulation)   Comfort (Breast/Nipple) 2-->soft/nontender   Hold (Positioning) 2-->no assist from staff, mother able to position/hold infant   Latch Score 10   Milk Expression/Equipment   Breast Pump Type double electric, personal  (zommee at home, recommended trying to get medela or spectra.)     Patient: Rock Black  : 2025  Last weight (12/15): 14-3  Today's weight (): 14-15.8    Infant was very distracted with nursing.  Mom states he snacks all day and night.  Feeds at least 10 times a day.  Mom does not respond well to a breast pump as she does not need to use one often.  Has been pumping after ~4 feeds a day and feels like supply has increased.  She feeds on demand and follows up with a bottle when infant does not seem satisfied.     SLP, Jossie Sotelo, provided oral assessment but sees no concerns or restrictions.      Weight gain appropriate since last doctor's visit.  Educated mom on appropriate weight gain of 3-5 oz/week for infant.  Discussed mom's diet and encouraged increased calories.      Mom has been fortifying breast milk when giving bottles.  Discussed possibility of supplementing after every feeding to see if infant will space feeds out instead of constantly snacking.  Infant appears developmentally on track.    PLAN OF CARE:   Continue fortifying breast milk as desired by mom and pediatrician.  Solid foods as infant desires, offer the breast prior to giving solids.  Pump anytime a bottle is given.  Can take sunflower lecithin (1200 mg qid) to assist with mixing foremilk and hindmilk/increasing calories in small feeds.  Mom to try to increase calories.  Needs 300-500 extra calories over her normal metabolic needs.  Follow-up with lactation as needed.

## 2024-08-03 ENCOUNTER — APPOINTMENT (OUTPATIENT)
Facility: HOSPITAL | Age: 34
End: 2024-08-03
Payer: MEDICAID

## 2024-08-03 ENCOUNTER — HOSPITAL ENCOUNTER (EMERGENCY)
Facility: HOSPITAL | Age: 34
Discharge: HOME | End: 2024-08-03
Attending: EMERGENCY MEDICINE
Payer: MEDICAID

## 2024-08-03 VITALS
HEIGHT: 69 IN | BODY MASS INDEX: 41.47 KG/M2 | TEMPERATURE: 98.8 F | WEIGHT: 280 LBS | DIASTOLIC BLOOD PRESSURE: 104 MMHG | HEART RATE: 96 BPM | OXYGEN SATURATION: 100 % | RESPIRATION RATE: 11 BRPM | SYSTOLIC BLOOD PRESSURE: 157 MMHG

## 2024-08-03 DIAGNOSIS — K52.9 COLITIS: Primary | ICD-10-CM

## 2024-08-03 DIAGNOSIS — R19.7 DIARRHEA, UNSPECIFIED TYPE: ICD-10-CM

## 2024-08-03 LAB
ALBUMIN SERPL-MCNC: 3.8 G/DL (ref 3.5–5.2)
ALBUMIN/GLOB SERPL: 1 G/DL
ALP SERPL-CCNC: 87 U/L (ref 39–117)
ALT SERPL W P-5'-P-CCNC: 17 U/L (ref 1–33)
ANION GAP SERPL CALCULATED.3IONS-SCNC: 13.6 MMOL/L (ref 5–15)
AST SERPL-CCNC: 33 U/L (ref 1–32)
BASOPHILS # BLD AUTO: 0.01 10*3/MM3 (ref 0–0.2)
BASOPHILS NFR BLD AUTO: 0.1 % (ref 0–1.5)
BILIRUB SERPL-MCNC: 0.3 MG/DL (ref 0–1.2)
BILIRUB UR QL STRIP: NEGATIVE
BUN SERPL-MCNC: 13 MG/DL (ref 6–20)
BUN/CREAT SERPL: 19.4 (ref 7–25)
CALCIUM SPEC-SCNC: 9 MG/DL (ref 8.6–10.5)
CHLORIDE SERPL-SCNC: 103 MMOL/L (ref 98–107)
CLARITY UR: CLEAR
CO2 SERPL-SCNC: 20.4 MMOL/L (ref 22–29)
COLOR UR: YELLOW
CREAT SERPL-MCNC: 0.67 MG/DL (ref 0.57–1)
DEPRECATED RDW RBC AUTO: 38.8 FL (ref 37–54)
EGFRCR SERPLBLD CKD-EPI 2021: 117.1 ML/MIN/1.73
EOSINOPHIL # BLD AUTO: 0.12 10*3/MM3 (ref 0–0.4)
EOSINOPHIL NFR BLD AUTO: 1 % (ref 0.3–6.2)
ERYTHROCYTE [DISTWIDTH] IN BLOOD BY AUTOMATED COUNT: 12.9 % (ref 12.3–15.4)
GLOBULIN UR ELPH-MCNC: 4 GM/DL
GLUCOSE SERPL-MCNC: 154 MG/DL (ref 65–99)
GLUCOSE UR STRIP-MCNC: NEGATIVE MG/DL
HCT VFR BLD AUTO: 37.4 % (ref 34–46.6)
HGB BLD-MCNC: 12.4 G/DL (ref 12–15.9)
HGB UR QL STRIP.AUTO: NEGATIVE
HOLD SPECIMEN: NORMAL
IMM GRANULOCYTES # BLD AUTO: 0.03 10*3/MM3 (ref 0–0.05)
IMM GRANULOCYTES NFR BLD AUTO: 0.3 % (ref 0–0.5)
KETONES UR QL STRIP: NEGATIVE
LEUKOCYTE ESTERASE UR QL STRIP.AUTO: NEGATIVE
LIPASE SERPL-CCNC: 40 U/L (ref 13–60)
LYMPHOCYTES # BLD AUTO: 1.31 10*3/MM3 (ref 0.7–3.1)
LYMPHOCYTES NFR BLD AUTO: 11.3 % (ref 19.6–45.3)
MCH RBC QN AUTO: 27.1 PG (ref 26.6–33)
MCHC RBC AUTO-ENTMCNC: 33.2 G/DL (ref 31.5–35.7)
MCV RBC AUTO: 81.7 FL (ref 79–97)
MONOCYTES # BLD AUTO: 1.17 10*3/MM3 (ref 0.1–0.9)
MONOCYTES NFR BLD AUTO: 10.1 % (ref 5–12)
NEUTROPHILS NFR BLD AUTO: 77.2 % (ref 42.7–76)
NEUTROPHILS NFR BLD AUTO: 8.96 10*3/MM3 (ref 1.7–7)
NITRITE UR QL STRIP: NEGATIVE
PH UR STRIP.AUTO: 6 [PH] (ref 5–8)
PLATELET # BLD AUTO: 252 10*3/MM3 (ref 140–450)
PMV BLD AUTO: 10.2 FL (ref 6–12)
POTASSIUM SERPL-SCNC: 4.4 MMOL/L (ref 3.5–5.2)
PROT SERPL-MCNC: 7.8 G/DL (ref 6–8.5)
PROT UR QL STRIP: NEGATIVE
RBC # BLD AUTO: 4.58 10*6/MM3 (ref 3.77–5.28)
SODIUM SERPL-SCNC: 137 MMOL/L (ref 136–145)
SP GR UR STRIP: 1.02 (ref 1–1.03)
UROBILINOGEN UR QL STRIP: NORMAL
WBC NRBC COR # BLD AUTO: 11.6 10*3/MM3 (ref 3.4–10.8)
WHOLE BLOOD HOLD COAG: NORMAL
WHOLE BLOOD HOLD SPECIMEN: NORMAL

## 2024-08-03 PROCEDURE — 96374 THER/PROPH/DIAG INJ IV PUSH: CPT

## 2024-08-03 PROCEDURE — 74177 CT ABD & PELVIS W/CONTRAST: CPT

## 2024-08-03 PROCEDURE — 25010000002 MORPHINE PER 10 MG: Performed by: EMERGENCY MEDICINE

## 2024-08-03 PROCEDURE — 83690 ASSAY OF LIPASE: CPT | Performed by: EMERGENCY MEDICINE

## 2024-08-03 PROCEDURE — 85025 COMPLETE CBC W/AUTO DIFF WBC: CPT | Performed by: EMERGENCY MEDICINE

## 2024-08-03 PROCEDURE — 25810000003 SODIUM CHLORIDE 0.9 % SOLUTION: Performed by: PHYSICIAN ASSISTANT

## 2024-08-03 PROCEDURE — 25510000001 IOPAMIDOL 61 % SOLUTION: Performed by: EMERGENCY MEDICINE

## 2024-08-03 PROCEDURE — 81003 URINALYSIS AUTO W/O SCOPE: CPT | Performed by: EMERGENCY MEDICINE

## 2024-08-03 PROCEDURE — 25010000002 ONDANSETRON PER 1 MG: Performed by: PHYSICIAN ASSISTANT

## 2024-08-03 PROCEDURE — 96361 HYDRATE IV INFUSION ADD-ON: CPT

## 2024-08-03 PROCEDURE — 99285 EMERGENCY DEPT VISIT HI MDM: CPT

## 2024-08-03 PROCEDURE — 80053 COMPREHEN METABOLIC PANEL: CPT | Performed by: EMERGENCY MEDICINE

## 2024-08-03 PROCEDURE — 96375 TX/PRO/DX INJ NEW DRUG ADDON: CPT

## 2024-08-03 RX ORDER — DICYCLOMINE HCL 20 MG
20 TABLET ORAL EVERY 6 HOURS
Qty: 12 TABLET | Refills: 0 | Status: SHIPPED | OUTPATIENT
Start: 2024-08-03

## 2024-08-03 RX ORDER — ONDANSETRON 2 MG/ML
4 INJECTION INTRAMUSCULAR; INTRAVENOUS ONCE
Status: COMPLETED | OUTPATIENT
Start: 2024-08-03 | End: 2024-08-03

## 2024-08-03 RX ORDER — LAMOTRIGINE 25 MG/1
25 TABLET ORAL DAILY
COMMUNITY

## 2024-08-03 RX ORDER — BUPROPION HYDROCHLORIDE 100 MG/1
100 TABLET ORAL 2 TIMES DAILY
COMMUNITY

## 2024-08-03 RX ORDER — ONDANSETRON 4 MG/1
4 TABLET, FILM COATED ORAL EVERY 6 HOURS
Qty: 12 TABLET | Refills: 0 | Status: SHIPPED | OUTPATIENT
Start: 2024-08-03

## 2024-08-03 RX ORDER — CIPROFLOXACIN 250 MG/1
500 TABLET, FILM COATED ORAL ONCE
Status: COMPLETED | OUTPATIENT
Start: 2024-08-03 | End: 2024-08-03

## 2024-08-03 RX ORDER — SODIUM CHLORIDE 9 MG/ML
10 INJECTION, SOLUTION INTRAMUSCULAR; INTRAVENOUS; SUBCUTANEOUS AS NEEDED
Status: DISCONTINUED | OUTPATIENT
Start: 2024-08-03 | End: 2024-08-03 | Stop reason: HOSPADM

## 2024-08-03 RX ORDER — CIPROFLOXACIN 500 MG/1
500 TABLET, FILM COATED ORAL 2 TIMES DAILY
Qty: 20 TABLET | Refills: 0 | Status: SHIPPED | OUTPATIENT
Start: 2024-08-03

## 2024-08-03 RX ADMIN — SODIUM CHLORIDE 1000 ML: 9 INJECTION, SOLUTION INTRAVENOUS at 09:17

## 2024-08-03 RX ADMIN — ONDANSETRON 4 MG: 2 INJECTION INTRAMUSCULAR; INTRAVENOUS at 09:16

## 2024-08-03 RX ADMIN — SODIUM CHLORIDE 1000 ML: 9 INJECTION, SOLUTION INTRAVENOUS at 12:16

## 2024-08-03 RX ADMIN — MORPHINE SULFATE 4 MG: 4 INJECTION, SOLUTION INTRAMUSCULAR; INTRAVENOUS at 11:36

## 2024-08-03 RX ADMIN — CIPROFLOXACIN HYDROCHLORIDE 500 MG: 250 TABLET, FILM COATED ORAL at 12:16

## 2024-08-03 RX ADMIN — IOPAMIDOL 100 ML: 612 INJECTION, SOLUTION INTRAVENOUS at 10:03

## 2024-08-03 NOTE — FS[facility] PROVIDER NOTE
"Subjective  History of Present Illness:    Patient is a 33-year-old female presenting to the emergency department complaints of diarrhea.  She states she does have chronic diarrhea and is currently following with gastroenterology.  She states for last week, she has had approximately 10 episodes of diarrhea daily.  She states she developed bright red blood streaked diarrhea 3 days ago and has had lower abdominal pain that was severe last night.  She states that has improved this morning.  She denies dysuria, fever.  She does report intermittent vomiting.    Nurses Notes reviewed and agree, including vitals, allergies, social history and prior medical history.     REVIEW OF SYSTEMS: All systems reviewed and not pertinent unless noted.  Review of Systems   Gastrointestinal:  Positive for blood in stool and diarrhea.   All other systems reviewed and are negative.      Past Medical History:   Diagnosis Date    Asthma     as a kid, pt reports \"I don't remeber ever not having an inhaler\"    Asthma due to seasonal allergies     Depression     Disease of thyroid gland     pt states \"I am concerned about it, but no official diagnosis or meds\"    Gestational diabetes     History of bipolar disorder     History of blood transfusion     post miscarriage    Miscarriage     - pt reports getting blood transfusion       Allergies:    Demerol [meperidine] and Lexapro [escitalopram oxalate]      Past Surgical History:   Procedure Laterality Date    DILATION AND CURETTAGE, DIAGNOSTIC / THERAPEUTIC      - bleeding- blood transfusion, early miscarriage    WISDOM TOOTH EXTRACTION           Social History     Socioeconomic History    Marital status: Single   Tobacco Use    Smoking status: Former     Current packs/day: 0.00     Types: Cigarettes     Start date:      Quit date:      Years since quittin.5    Smokeless tobacco: Never   Vaping Use    Vaping status: Never Used   Substance and Sexual Activity    " "Alcohol use: Not Currently     Comment: occasionally    Drug use: Not Currently     Types: Marijuana     Comment: last smoked in August, pt reports \" Not a thing at all anymore\"    Sexual activity: Defer         Family History   Problem Relation Age of Onset    Hypertension Mother     Obesity Sister     Asthma Daughter     Heart defect Son        Objective  Physical Exam:  BP (!) 157/104   Pulse 96   Temp 98.8 °F (37.1 °C) (Oral)   Resp 11   Ht 175.3 cm (69\")   Wt 127 kg (280 lb)   SpO2 100%   Breastfeeding No   BMI 41.35 kg/m²      Physical Exam  Vitals and nursing note reviewed.   Constitutional:       Appearance: She is well-developed and normal weight.   HENT:      Head: Normocephalic and atraumatic.   Cardiovascular:      Rate and Rhythm: Regular rhythm. Tachycardia present.   Pulmonary:      Effort: Pulmonary effort is normal.      Breath sounds: Normal breath sounds.   Abdominal:      Palpations: Abdomen is soft.      Tenderness:  in the right lower quadrant and left lower quadrant   Skin:     General: Skin is warm and dry.   Neurological:      General: No focal deficit present.      Mental Status: She is alert.   Psychiatric:         Mood and Affect: Mood normal.         Behavior: Behavior normal.         Procedures    ED Course:    Patient evaluated for abdominal pain and bloody diarrhea for 10 days.  White blood cell count was 11.  Labs otherwise negative for significant acute derangements.  She was tachycardic upon arrival.  She was given a bolus of IV fluids and this did improve.  CT imaging significant for colitis.  Unable to provide a stool sample in the emergency department and was given an outpatient order supplies to collect and return to lab.  She will be treated with Cipro for colitis.  She does have an upcoming appointment with GI in 1 week.  Advised return to the emergency department any worsening symptoms.  She understands and agrees with this plan of care.  She is well-appearing with " stable vitals at time of discharge  Lab Results (last 24 hours)       Procedure Component Value Units Date/Time    CBC & Differential [[id number]]  (Abnormal) Collected: 08/03/24 0911    Specimen: Blood Updated: 08/03/24 0921    Narrative:      The following orders were created for panel order CBC & Differential.  Procedure                               Abnormality         Status                     ---------                               -----------         ------                     CBC Auto Differential[[id number]]        Abnormal            Final result                 Please view results for these tests on the individual orders.    Comprehensive Metabolic Panel [[id number]]  (Abnormal) Collected: 08/03/24 0911    Specimen: Blood Updated: 08/03/24 0939     Glucose 154 mg/dL      BUN 13 mg/dL      Creatinine 0.67 mg/dL      Sodium 137 mmol/L      Potassium 4.4 mmol/L      Chloride 103 mmol/L      CO2 20.4 mmol/L      Calcium 9.0 mg/dL      Total Protein 7.8 g/dL      Albumin 3.8 g/dL      ALT (SGPT) 17 U/L      AST (SGOT) 33 U/L      Alkaline Phosphatase 87 U/L      Total Bilirubin 0.3 mg/dL      Globulin 4.0 gm/dL      A/G Ratio 1.0 g/dL      BUN/Creatinine Ratio 19.4     Anion Gap 13.6 mmol/L      eGFR 117.1 mL/min/1.73     Narrative:      GFR Normal >60  Chronic Kidney Disease <60  Kidney Failure <15      Lipase [[id number]]  (Normal) Collected: 08/03/24 0911    Specimen: Blood Updated: 08/03/24 0938     Lipase 40 U/L     CBC Auto Differential [[id number]]  (Abnormal) Collected: 08/03/24 0911    Specimen: Blood Updated: 08/03/24 0921     WBC 11.60 10*3/mm3      RBC 4.58 10*6/mm3      Hemoglobin 12.4 g/dL      Hematocrit 37.4 %      MCV 81.7 fL      MCH 27.1 pg      MCHC 33.2 g/dL      RDW 12.9 %      RDW-SD 38.8 fl      MPV 10.2 fL      Platelets 252 10*3/mm3      Neutrophil % 77.2 %      Lymphocyte % 11.3 %      Monocyte % 10.1 %      Eosinophil % 1.0 %      Basophil % 0.1 %      Immature Grans % 0.3 %       Neutrophils, Absolute 8.96 10*3/mm3      Lymphocytes, Absolute 1.31 10*3/mm3      Monocytes, Absolute 1.17 10*3/mm3      Eosinophils, Absolute 0.12 10*3/mm3      Basophils, Absolute 0.01 10*3/mm3      Immature Grans, Absolute 0.03 10*3/mm3     Urinalysis With Microscopic If Indicated (No Culture) - Urine, Clean Catch [[id number]]  (Normal) Collected: 08/03/24 1025    Specimen: Urine, Clean Catch Updated: 08/03/24 1030     Color, UA Yellow     Appearance, UA Clear     pH, UA 6.0     Specific Gravity, UA 1.020     Glucose, UA Negative     Ketones, UA Negative     Bilirubin, UA Negative     Blood, UA Negative     Protein, UA Negative     Leuk Esterase, UA Negative     Nitrite, UA Negative     Urobilinogen, UA 0.2 E.U./dL    Narrative:      Urine microscopic not indicated.             CT Abdomen Pelvis With Contrast    Result Date: 8/3/2024  CT ABDOMEN PELVIS W CONTRAST Date of Exam: 8/3/2024 9:56 AM EDT Indication: abdominal pain. Comparison: None available. Technique: Axial CT images were obtained of the abdomen and pelvis following the uneventful intravenous administration of 100 mL Isovue-300. Reconstructed coronal and sagittal images were also obtained. Automated exposure control and iterative construction methods were used. Findings: Lower thorax: Innumerable calcified granulomas seen in the lung base. No coronary artery calcifications seen in the visualized heart. No pericardial effusion.  No evidence of pulmonary embolus in the visualized pulmonary arteries. Liver: No focal hepatic lesions seen.  Normal hepatic size. Normal density of the liver. Gallbladder and bile ducts: Normal, nondistended appearance of the gallbladder.  No intra- or extra- hepatic biliary ductal dilatation. Spleen: Normal appearance of the spleen. Pancreas: Normal appearance of the pancreas. Main pancreatic duct is nondilated. Adrenals: Normal appearance of the adrenal glands. Kidneys: Normal appearance of the kidneys. Symmetric enhancement  and excretion of contrast. No nephrolithiasis.  Normal caliber of the ureters. Bowel: Moderate hiatal hernia. Normal caliber the bowel. There is mild fat stranding surrounding the proximal sigmoid colon. Normal appearance of the appendix. No substantial diverticular disease. Pelvis: Limited evaluation of partially distended bladder.  Normal appearance of the uterus. 4.5 cm right adnexal cyst likely presents an ovarian cyst. Left ovary appears normal. Peritoneum: No free air. No free fluid. No peritoneal nodularity. Vessels: Normal aortic caliber.  No atherosclerotic calcification of the aorta. Celiac artery, splenic artery, superior mesenteric artery, inferior mesenteric artery, renal arteries and iliac arteries appear patent. Iliac veins, inferior vena cava, superior mesenteric vein, renal veins, portal vein and splenic vein are patent. Lymph nodes: No enlarged or suspicious adenopathy. Bones: No acute osseous abnormality. No substantial degenerative changes. Soft tissues: Unremarkable appearance of the soft tissues.     Impression: Impression: Small amount of fat stranding around the proximal sigmoid colon may reflect colitis. Otherwise no evidence of acute intra-abdominal abnormality. Electronically Signed: Torres Dietz MD  8/3/2024 10:13 AM EDT  Workstation ID: [id number]        MDM     Amount and/or Complexity of Data Reviewed  Clinical lab tests: reviewed  Tests in the medicine section of CPT®: reviewed          DDX: includes but is not limited to: Colitis, enterovirus, dehydration    Patient arrives POV with vitals interpreted by myself.     Pertinent features from physical exam: Abdomen soft.  Moderate lower abdominal tenderness.  Nonsurgical in nature..    Interventions / Re-evaluation: Patient reports improvement of pain, nausea.  She is tolerating p.o. fluids.      Medications   sodium chloride 0.9 % bolus 1,000 mL (0 mL Intravenous Stopped 8/3/24 1140)   ondansetron (ZOFRAN) injection 4 mg (4 mg  Intravenous Given 8/3/24 0916)   iopamidol (ISOVUE-300) 61 % injection 100 mL (100 mL Intravenous Given 8/3/24 1003)   morphine injection 4 mg (4 mg Intravenous Given 8/3/24 1136)   ciprofloxacin (CIPRO) tablet 500 mg (500 mg Oral Given 8/3/24 1216)   sodium chloride 0.9 % bolus 1,000 mL (0 mL Intravenous Stopped 8/3/24 1406)       Results/clinical rationale were discussed with patient        -----  ED Disposition       ED Disposition   Discharge    Condition   Stable    Comment   --             Final diagnoses:   Colitis   Diarrhea, unspecified type      Your Follow-Up Providers       Provider, No Known.    Follow up details: recheck of symptoms  [facility] [id number]                       Contact information for after-discharge care    Follow-up information has not been specified.                    Your medication list        START taking these medications        Instructions Last Dose Given Next Dose Due   ciprofloxacin 500 MG tablet  Commonly known as: CIPRO      Take 1 tablet by mouth 2 (Two) Times a Day.       dicyclomine 20 MG tablet  Commonly known as: BENTYL      Take 1 tablet by mouth Every 6 (Six) Hours.       ondansetron 4 MG tablet  Commonly known as: ZOFRAN      Take 1 tablet by mouth Every 6 (Six) Hours.              CONTINUE taking these medications        Instructions Last Dose Given Next Dose Due   buPROPion 100 MG tablet  Commonly known as: WELLBUTRIN      Take 1 tablet by mouth 2 (Two) Times a Day.       lamoTRIgine 25 MG tablet  Commonly known as: LaMICtal      Take 1 tablet by mouth Daily.       prenatal vitamin 28-0.8 28-0.8 MG tablet tablet      Take 1 tablet by mouth Daily.       Stool Softener 100 MG capsule  Generic drug: docusate sodium      Take 1 capsule by mouth 2 (Two) Times a Day As Needed for Constipation.                 Where to Get Your Medications        These medications were sent to [facility] [id number] - [place], [place] - [address] -  [phone]  - [phone] FX  [address] [address]      Phone: [phone]   ciprofloxacin 500 MG tablet  dicyclomine 20 MG tablet  ondansetron 4 MG tablet

## 2024-08-07 ENCOUNTER — LAB (OUTPATIENT)
Facility: HOSPITAL | Age: 34
End: 2024-08-07
Payer: MEDICAID

## 2024-08-07 DIAGNOSIS — R19.7 DIARRHEA, UNSPECIFIED TYPE: ICD-10-CM

## 2024-08-07 LAB
ADV 40+41 DNA STL QL NAA+NON-PROBE: NOT DETECTED
ASTRO TYP 1-8 RNA STL QL NAA+NON-PROBE: NOT DETECTED
C CAYETANENSIS DNA STL QL NAA+NON-PROBE: NOT DETECTED
C COLI+JEJ+UPSA DNA STL QL NAA+NON-PROBE: NOT DETECTED
C DIFF TOX GENS STL QL NAA+PROBE: NOT DETECTED
CRYPTOSP DNA STL QL NAA+NON-PROBE: NOT DETECTED
E HISTOLYT DNA STL QL NAA+NON-PROBE: NOT DETECTED
EAEC PAA PLAS AGGR+AATA ST NAA+NON-PRB: NOT DETECTED
EC STX1+STX2 GENES STL QL NAA+NON-PROBE: NOT DETECTED
EPEC EAE GENE STL QL NAA+NON-PROBE: NOT DETECTED
ETEC LTA+ST1A+ST1B TOX ST NAA+NON-PROBE: NOT DETECTED
G LAMBLIA DNA STL QL NAA+NON-PROBE: NOT DETECTED
NOROVIRUS GI+II RNA STL QL NAA+NON-PROBE: NOT DETECTED
P SHIGELLOIDES DNA STL QL NAA+NON-PROBE: NOT DETECTED
RVA RNA STL QL NAA+NON-PROBE: NOT DETECTED
S ENT+BONG DNA STL QL NAA+NON-PROBE: NOT DETECTED
SAPO I+II+IV+V RNA STL QL NAA+NON-PROBE: NOT DETECTED
SHIGELLA SP+EIEC IPAH ST NAA+NON-PROBE: NOT DETECTED
V CHOL+PARA+VUL DNA STL QL NAA+NON-PROBE: NOT DETECTED
V CHOLERAE DNA STL QL NAA+NON-PROBE: NOT DETECTED
Y ENTEROCOL DNA STL QL NAA+NON-PROBE: NOT DETECTED

## 2024-08-07 PROCEDURE — 87493 C DIFF AMPLIFIED PROBE: CPT

## 2024-10-29 ENCOUNTER — HOSPITAL ENCOUNTER (EMERGENCY)
Facility: HOSPITAL | Age: 34
Discharge: ANOTHER HOSPITAL | End: 2024-10-29
Attending: EMERGENCY MEDICINE | Admitting: EMERGENCY MEDICINE
Payer: MEDICAID

## 2024-10-29 ENCOUNTER — APPOINTMENT (OUTPATIENT)
Facility: HOSPITAL | Age: 34
End: 2024-10-29
Payer: MEDICAID

## 2024-10-29 VITALS
DIASTOLIC BLOOD PRESSURE: 90 MMHG | HEART RATE: 80 BPM | TEMPERATURE: 98 F | SYSTOLIC BLOOD PRESSURE: 143 MMHG | HEIGHT: 70 IN | WEIGHT: 261 LBS | OXYGEN SATURATION: 100 % | BODY MASS INDEX: 37.37 KG/M2 | RESPIRATION RATE: 16 BRPM

## 2024-10-29 DIAGNOSIS — K92.2 LOWER GASTROINTESTINAL BLEED: Primary | ICD-10-CM

## 2024-10-29 DIAGNOSIS — K51.919 ULCERATIVE COLITIS WITH COMPLICATION, UNSPECIFIED LOCATION: ICD-10-CM

## 2024-10-29 LAB
ADV 40+41 DNA STL QL NAA+NON-PROBE: NOT DETECTED
ALBUMIN SERPL-MCNC: 4 G/DL (ref 3.5–5.2)
ALBUMIN/GLOB SERPL: 1.2 G/DL
ALP SERPL-CCNC: 71 U/L (ref 39–117)
ALT SERPL W P-5'-P-CCNC: <5 U/L (ref 1–33)
ANION GAP SERPL CALCULATED.3IONS-SCNC: 9 MMOL/L (ref 5–15)
APTT PPP: 35.4 SECONDS (ref 60–90)
AST SERPL-CCNC: 23 U/L (ref 1–32)
ASTRO TYP 1-8 RNA STL QL NAA+NON-PROBE: NOT DETECTED
BASOPHILS # BLD AUTO: 0.01 10*3/MM3 (ref 0–0.2)
BASOPHILS NFR BLD AUTO: 0.2 % (ref 0–1.5)
BILIRUB SERPL-MCNC: 0.3 MG/DL (ref 0–1.2)
BUN SERPL-MCNC: 17 MG/DL (ref 6–20)
BUN/CREAT SERPL: 26.2 (ref 7–25)
C CAYETANENSIS DNA STL QL NAA+NON-PROBE: NOT DETECTED
C COLI+JEJ+UPSA DNA STL QL NAA+NON-PROBE: NOT DETECTED
CALCIUM SPEC-SCNC: 9.2 MG/DL (ref 8.6–10.5)
CHLORIDE SERPL-SCNC: 103 MMOL/L (ref 98–107)
CO2 SERPL-SCNC: 24 MMOL/L (ref 22–29)
CREAT SERPL-MCNC: 0.65 MG/DL (ref 0.57–1)
CRP SERPL-MCNC: 4.03 MG/DL (ref 0–0.5)
CRYPTOSP DNA STL QL NAA+NON-PROBE: NOT DETECTED
DEPRECATED RDW RBC AUTO: 40.8 FL (ref 37–54)
E HISTOLYT DNA STL QL NAA+NON-PROBE: NOT DETECTED
EAEC PAA PLAS AGGR+AATA ST NAA+NON-PRB: NOT DETECTED
EC STX1+STX2 GENES STL QL NAA+NON-PROBE: NOT DETECTED
EGFRCR SERPLBLD CKD-EPI 2021: 117.9 ML/MIN/1.73
EOSINOPHIL # BLD AUTO: 0.09 10*3/MM3 (ref 0–0.4)
EOSINOPHIL NFR BLD AUTO: 1.4 % (ref 0.3–6.2)
EPEC EAE GENE STL QL NAA+NON-PROBE: NOT DETECTED
ERYTHROCYTE [DISTWIDTH] IN BLOOD BY AUTOMATED COUNT: 13.6 % (ref 12.3–15.4)
ERYTHROCYTE [SEDIMENTATION RATE] IN BLOOD: 32 MM/HR (ref 0–20)
ETEC LTA+ST1A+ST1B TOX ST NAA+NON-PROBE: NOT DETECTED
G LAMBLIA DNA STL QL NAA+NON-PROBE: NOT DETECTED
GLOBULIN UR ELPH-MCNC: 3.4 GM/DL
GLUCOSE SERPL-MCNC: 138 MG/DL (ref 65–99)
HCT VFR BLD AUTO: 33.3 % (ref 34–46.6)
HGB BLD-MCNC: 10.7 G/DL (ref 12–15.9)
HOLD SPECIMEN: NORMAL
IMM GRANULOCYTES # BLD AUTO: 0.02 10*3/MM3 (ref 0–0.05)
IMM GRANULOCYTES NFR BLD AUTO: 0.3 % (ref 0–0.5)
INR PPP: 0.94 (ref 0.89–1.12)
LYMPHOCYTES # BLD AUTO: 2.29 10*3/MM3 (ref 0.7–3.1)
LYMPHOCYTES NFR BLD AUTO: 34.5 % (ref 19.6–45.3)
MCH RBC QN AUTO: 26.1 PG (ref 26.6–33)
MCHC RBC AUTO-ENTMCNC: 32.1 G/DL (ref 31.5–35.7)
MCV RBC AUTO: 81.2 FL (ref 79–97)
MONOCYTES # BLD AUTO: 0.56 10*3/MM3 (ref 0.1–0.9)
MONOCYTES NFR BLD AUTO: 8.4 % (ref 5–12)
NEUTROPHILS NFR BLD AUTO: 3.66 10*3/MM3 (ref 1.7–7)
NEUTROPHILS NFR BLD AUTO: 55.2 % (ref 42.7–76)
NOROVIRUS GI+II RNA STL QL NAA+NON-PROBE: NOT DETECTED
P SHIGELLOIDES DNA STL QL NAA+NON-PROBE: NOT DETECTED
PLATELET # BLD AUTO: 261 10*3/MM3 (ref 140–450)
PMV BLD AUTO: 10.9 FL (ref 6–12)
POTASSIUM SERPL-SCNC: 4.2 MMOL/L (ref 3.5–5.2)
PROT SERPL-MCNC: 7.4 G/DL (ref 6–8.5)
PROTHROMBIN TIME: 13.1 SECONDS (ref 12.2–14.5)
RBC # BLD AUTO: 4.1 10*6/MM3 (ref 3.77–5.28)
RVA RNA STL QL NAA+NON-PROBE: NOT DETECTED
S ENT+BONG DNA STL QL NAA+NON-PROBE: NOT DETECTED
SAPO I+II+IV+V RNA STL QL NAA+NON-PROBE: NOT DETECTED
SHIGELLA SP+EIEC IPAH ST NAA+NON-PROBE: NOT DETECTED
SODIUM SERPL-SCNC: 136 MMOL/L (ref 136–145)
V CHOL+PARA+VUL DNA STL QL NAA+NON-PROBE: NOT DETECTED
V CHOLERAE DNA STL QL NAA+NON-PROBE: NOT DETECTED
WBC NRBC COR # BLD AUTO: 6.63 10*3/MM3 (ref 3.4–10.8)
WHOLE BLOOD HOLD COAG: NORMAL
WHOLE BLOOD HOLD SPECIMEN: NORMAL
Y ENTEROCOL DNA STL QL NAA+NON-PROBE: NOT DETECTED

## 2024-10-29 PROCEDURE — 80053 COMPREHEN METABOLIC PANEL: CPT | Performed by: EMERGENCY MEDICINE

## 2024-10-29 PROCEDURE — 86140 C-REACTIVE PROTEIN: CPT | Performed by: EMERGENCY MEDICINE

## 2024-10-29 PROCEDURE — 83993 ASSAY FOR CALPROTECTIN FECAL: CPT | Performed by: EMERGENCY MEDICINE

## 2024-10-29 PROCEDURE — 85652 RBC SED RATE AUTOMATED: CPT | Performed by: EMERGENCY MEDICINE

## 2024-10-29 PROCEDURE — 25510000001 IOPAMIDOL 61 % SOLUTION: Performed by: EMERGENCY MEDICINE

## 2024-10-29 PROCEDURE — 99285 EMERGENCY DEPT VISIT HI MDM: CPT

## 2024-10-29 PROCEDURE — 87507 IADNA-DNA/RNA PROBE TQ 12-25: CPT | Performed by: EMERGENCY MEDICINE

## 2024-10-29 PROCEDURE — 85730 THROMBOPLASTIN TIME PARTIAL: CPT | Performed by: EMERGENCY MEDICINE

## 2024-10-29 PROCEDURE — 85025 COMPLETE CBC W/AUTO DIFF WBC: CPT | Performed by: EMERGENCY MEDICINE

## 2024-10-29 PROCEDURE — 74177 CT ABD & PELVIS W/CONTRAST: CPT

## 2024-10-29 PROCEDURE — 85610 PROTHROMBIN TIME: CPT | Performed by: EMERGENCY MEDICINE

## 2024-10-29 RX ORDER — SODIUM CHLORIDE 0.9 % (FLUSH) 0.9 %
10 SYRINGE (ML) INJECTION AS NEEDED
Status: DISCONTINUED | OUTPATIENT
Start: 2024-10-29 | End: 2024-10-29 | Stop reason: HOSPADM

## 2024-10-29 RX ORDER — IOPAMIDOL 612 MG/ML
100 INJECTION, SOLUTION INTRAVASCULAR
Status: COMPLETED | OUTPATIENT
Start: 2024-10-29 | End: 2024-10-29

## 2024-10-29 RX ADMIN — IOPAMIDOL 85 ML: 612 INJECTION, SOLUTION INTRAVENOUS at 13:05

## 2024-10-29 NOTE — FSED PROVIDER NOTE
"Subjective  History of Present Illness:    [Patient arrives emergency department with complaint of bright red blood per rectum.  This is without pain.  Started this morning.  She follows with GI and rheumatology at [facility].    Patient's last emergency department on 8/3 for diarrhea.  Has a history of chronic diarrhea.  At that time she was having bright red blood and abdominal pain.  She was referred to GI.  Scan at that time was noted to have a proximal sigmoid colitis.  She was started on Cipro and doxylamine.  She had some improvement.  GI note from 8/26 was reviewed-prior to that visit she had diarrhea for 2 years.  Laboratory studies were obtained during that GI visit.  At the time thought she had infectious colitis, diverticular colitis or IBD.  Patient was positive for Strongyloides ease.  She was prescribed ivermectin.  She has not followed up with GI since that initial visit.]    Nurses Notes reviewed and agree, including vitals, allergies, social history and prior medical history.     REVIEW OF SYSTEMS: All systems reviewed and not pertinent unless noted.    Past Medical History:   Diagnosis Date    Asthma     as a kid, pt reports \"I don't remeber ever not having an inhaler\"    Asthma due to seasonal allergies     Depression 2007    Disease of thyroid gland     pt states \"I am concerned about it, but no official diagnosis or meds\"    Gestational diabetes     History of bipolar disorder 2007    History of blood transfusion 2011    post miscarriage    Miscarriage     2011- pt reports getting blood transfusion       Allergies:    Demerol [meperidine] and Lexapro [escitalopram oxalate]      Past Surgical History:   Procedure Laterality Date    DILATION AND CURETTAGE, DIAGNOSTIC / THERAPEUTIC      2011- bleeding- blood transfusion, early miscarriage    WISDOM TOOTH EXTRACTION           Social History     Socioeconomic History    Marital status: Single   Tobacco Use    Smoking status: Former     " "Current packs/day: 0.00     Types: Cigarettes     Start date:      Quit date:      Years since quittin.8    Smokeless tobacco: Never   Vaping Use    Vaping status: Never Used   Substance and Sexual Activity    Alcohol use: Not Currently     Comment: occasionally    Drug use: Not Currently     Types: Marijuana     Comment: last smoked in August, pt reports \" Not a thing at all anymore\"    Sexual activity: Defer         Family History   Problem Relation Age of Onset    Hypertension Mother     Obesity Sister     Asthma Daughter     Heart defect Son        Objective  Physical Exam:  BP (!) 129/104   Pulse 93   Temp 98 °F (36.7 °C) (Oral)   Resp 16   Ht 177.8 cm (70\")   Wt 118 kg (261 lb)   LMP 10/10/2024 (Approximate)   SpO2 100%   BMI 37.45 kg/m²      [Primary Survey    Airway: [patent and protected]  Breathing: [symmetric bilaterally]  Circulation: [mentating well, responsive]        Constitutional: [Nontoxic appearance.]  Psychological: [ No abnormalities of mood affect.]  Head: [Atraumatic]  Eyes: [Conjunctiva are non-injected. no scleral icterus.]  ENT: [no obvious congestion or obstruction noted ]  Neck: [no obvious deformity]. [ROM appears preserved]  Chest: [no deformity noted.] [No paradoxical breathing noted]  Respiratory: [Respiratory effort was normal - no use of accessory respiratory muscles noted.] [ There is no stridor.] []  Cardiovascular:  [perfusion appears preserved - mentating well] [RRR] [No murmurs] []  Gastrointestinal: [Abdomen nondistended. ]  Soft nontender  Genitourinary: [Not examined]  Lymphatic: [Not examined]  Back: [not examined]  Musculoskeletal: [Musculoskeletal system is grossly intact.  There is no obvious deformity.]  Neurological: Face: [No Asymmetry]. [ Gross motor movement is intact in all 4 extremities.] [Walks and ambulates without difficulty.]  [ Patient exhibits normal speech.]  Skin: [No Pallor] [No obvious bruising. ] [No obvious rash.]]      ED " Course:    Lab Results (last 24 hours)       Procedure Component Value Units Date/Time    CBC & Differential [[id number]]  (Abnormal) Collected: 10/29/24 1027    Specimen: Blood Updated: 10/29/24 1049    Narrative:      The following orders were created for panel order CBC & Differential.  Procedure                               Abnormality         Status                     ---------                               -----------         ------                     CBC Auto Differential[[id number]]        Abnormal            Final result                 Please view results for these tests on the individual orders.    Comprehensive Metabolic Panel [[id number]]  (Abnormal) Collected: 10/29/24 1027    Specimen: Blood Updated: 10/29/24 1103     Glucose 138 mg/dL      BUN 17 mg/dL      Creatinine 0.65 mg/dL      Sodium 136 mmol/L      Potassium 4.2 mmol/L      Chloride 103 mmol/L      CO2 24.0 mmol/L      Calcium 9.2 mg/dL      Total Protein 7.4 g/dL      Albumin 4.0 g/dL      ALT (SGPT) <5 U/L      AST (SGOT) 23 U/L      Alkaline Phosphatase 71 U/L      Total Bilirubin 0.3 mg/dL      Globulin 3.4 gm/dL      A/G Ratio 1.2 g/dL      BUN/Creatinine Ratio 26.2     Anion Gap 9.0 mmol/L      eGFR 117.9 mL/min/1.73     Narrative:      GFR Normal >60  Chronic Kidney Disease <60  Kidney Failure <15      aPTT [[id number]]  (Abnormal) Collected: 10/29/24 1027    Specimen: Blood Updated: 10/29/24 1054     PTT 35.4 seconds     Narrative:      PTT = The equivalent PTT values for the therapeutic range of heparin levels at 0.3 to 0.5 U/ml are 60 to 70 seconds.    Protime-INR [[id number]]  (Normal) Collected: 10/29/24 1027    Specimen: Blood Updated: 10/29/24 1054     Protime 13.1 Seconds      INR 0.94    C-reactive Protein [[id number]]  (Abnormal) Collected: 10/29/24 1027    Specimen: Blood Updated: 10/29/24 1059     C-Reactive Protein 4.03 mg/dL     Sedimentation Rate [[id number]]  (Abnormal) Collected: 10/29/24 1027    Specimen: Blood Updated:  10/29/24 1054     Sed Rate 32 mm/hr     CBC Auto Differential [[id number]]  (Abnormal) Collected: 10/29/24 1027    Specimen: Blood Updated: 10/29/24 1049     WBC 6.63 10*3/mm3      RBC 4.10 10*6/mm3      Hemoglobin 10.7 g/dL      Hematocrit 33.3 %      MCV 81.2 fL      MCH 26.1 pg      MCHC 32.1 g/dL      RDW 13.6 %      RDW-SD 40.8 fl      MPV 10.9 fL      Platelets 261 10*3/mm3      Neutrophil % 55.2 %      Lymphocyte % 34.5 %      Monocyte % 8.4 %      Eosinophil % 1.4 %      Basophil % 0.2 %      Immature Grans % 0.3 %      Neutrophils, Absolute 3.66 10*3/mm3      Lymphocytes, Absolute 2.29 10*3/mm3      Monocytes, Absolute 0.56 10*3/mm3      Eosinophils, Absolute 0.09 10*3/mm3      Basophils, Absolute 0.01 10*3/mm3      Immature Grans, Absolute 0.02 10*3/mm3     Gastrointestinal Panel, PCR - Stool, Per Rectum [[id number]] Collected: 10/29/24 1124    Specimen: Stool from Per Rectum Updated: 10/29/24 1130    Calprotectin, Fecal - Stool, Per Rectum [[id number]] Collected: 10/29/24 1124    Specimen: Stool from Per Rectum Updated: 10/29/24 1130             CT Abdomen Pelvis With Contrast    Result Date: 10/29/2024  CT ABDOMEN PELVIS W CONTRAST Date of Exam: 10/29/2024 12:57 PM EDT Indication: hematochezia. Comparison: CT abdomen pelvis with contrast 8/3/2024. Technique: Axial CT images were obtained of the abdomen and pelvis following the uneventful intravenous administration of . Reconstructed coronal and sagittal images were also obtained. Automated exposure control and iterative construction methods were used. Findings:  Segmental thickening and inflammatory stranding surrounding the distal descending colon and sigmoid colonic junction in the left lower quadrant of the abdomen (series 2 image 102), suggest the presence of infectious/inflammatory colitis, similar in appearance to the prior study. There also appears to be segmental thickening and inflammatory stranding surrounding the mid ascending colon, as well.  There is no pneumatosis. No focal diverticular changes are identified on this study. No evidence of bowel obstruction. Appendix appears normal. No significant retained colonic stool burden. No abnormally dilated small bowel loops. Mildly enlarged right lower quadrant mesenteric lymph node measures 1.9 x 1.3 cm (2/74), unchanged. Liver, gallbladder, spleen, pancreas, adrenals, and kidneys are normal. Urinary bladder, uterus and rectum are normal small left ovarian cyst measures 1.7 cm. No free air, free fluid or abscess is identified. Heart size is normal. Benign calcified granulomatous nodularity is seen in the lung bases. No acute basilar airspace disease. Stable small esophageal hiatal hernia. No acute or suspicious osseous abnormalities are identified.       Impression: Impression: Segmental infectious/inflammatory colitis symptoms are suggested in the distal descending colon-sigmoid colon junction and within the ascending colon. Electronically Signed: Irina Plasencia MD  10/29/2024 1:22 PM EDT  Workstation ID: [id number]      No orders to display       Procedures    MDM      [Initial impression of presenting illness and DDX: Adult female with hematochezia.  Vitals are stable.  No hypoxia or hypotension.  Abdominal exam is soft and benign.  Low suspicion for surgical process or mesenteric ischemia    differential diagnosis includes but no limited to the following: IBD, colitis, repeat infection.    initial plan and/or treatments: []     diagnostic plan was ordered and interpreted by JEANA Hall MD.]    ED Course as of 10/29/24 1429   Tue Oct 29, 2024   1020 Jan reviewed. [ZHANNA]   1153 Comprehensive Metabolic Panel(!) [ZHANNA]   1154 C-reactive Protein(!) [ZHANNA]   1154 Protime-INR [ZHANNA]   1154 Sedimentation Rate(!) [ZHANNA]   1154 Hemoglobin(!): 10.7 [ZHANNA]   1154 CMP is nonactionable C-reactive protein elevated at 4.03.  INR is nonactionable sed rate is 32.  Hemoglobin is 10.7.  Otherwise the CBC is nonactionable. [ZHANNA]   1325 CMP  is nonactionable.  No significant anion gap.  C-reactive protein is down trended from 15 as measured in August of this year to 4.  Hemoglobin is stable at 10.7.  Last obtained in August was 11.9.  Sed rate elevated at 32.  GI panels are pending. [ZHANNA]   1334 CT was reviewed.  Patient has a colitis-appears to involve the distal ascending colon, sigmoid colon and ascending colon.  This diffuse colitis is likely the cause of her GI bleeding. [ZHANNA]   1341 Called  [facility] to discuss transfer   [ZHANNA]   1352 [place] score for GI bleed Is 18 [ZHANNA]   1356 Paged GI   [ZHANNA]   1357 [facility] on divert. No capacity for transfer   [ZHANNA]   1426 Dr. Mast reviewed this patient's case.  Patient was to make an exception for this patient as she receives all her care at  and follows with GI.  Patient has been accepted to [facility].  Will discuss with the patient for transfer. [ZHANNA]      ED Course User Index  [ZHANNA] Dean Hall MD        Medications   sodium chloride 0.9 % flush 10 mL (has no administration in time range)   iopamidol (ISOVUE-300) 61 % injection 100 mL (85 mL Intravenous Given 10/29/24 1305)       HEART SCORE   No data recorded           -----  ED Disposition       ED Disposition   Transfer to Another Facility     Condition   --    Comment   Transfer to [facility]             Final diagnoses:   Lower gastrointestinal bleed   Ulcerative colitis with complication, unspecified location     Your Follow-Up Providers    Follow-up information has not been specified.       Contact information for after-discharge care    Follow-up information has not been specified.          Your medication list        CONTINUE taking these medications        Instructions Last Dose Given Next Dose Due   buPROPion 100 MG tablet  Commonly known as: WELLBUTRIN      Take 1 tablet by mouth 2 (Two) Times a Day.       ciprofloxacin 500 MG tablet  Commonly known as: CIPRO      Take 1 tablet by mouth 2 (Two) Times a Day.       dicyclomine 20 MG  tablet  Commonly known as: BENTYL      Take 1 tablet by mouth Every 6 (Six) Hours.       lamoTRIgine 25 MG tablet  Commonly known as: LaMICtal      Take 1 tablet by mouth Daily.       ondansetron 4 MG tablet  Commonly known as: ZOFRAN      Take 1 tablet by mouth Every 6 (Six) Hours.       prenatal vitamin 28-0.8 28-0.8 MG tablet tablet      Take 1 tablet by mouth Daily.       Stool Softener 100 MG capsule  Generic drug: docusate sodium      Take 1 capsule by mouth 2 (Two) Times a Day As Needed for Constipation.

## 2024-11-01 LAB — CALPROTECTIN STL-MCNT: 415 UG/G (ref 0–120)
